# Patient Record
Sex: MALE | Race: BLACK OR AFRICAN AMERICAN | NOT HISPANIC OR LATINO | Employment: FULL TIME | URBAN - METROPOLITAN AREA
[De-identification: names, ages, dates, MRNs, and addresses within clinical notes are randomized per-mention and may not be internally consistent; named-entity substitution may affect disease eponyms.]

---

## 2017-02-23 ENCOUNTER — OFFICE VISIT (OUTPATIENT)
Dept: INTERNAL MEDICINE | Facility: CLINIC | Age: 42
End: 2017-02-23

## 2017-02-23 VITALS
SYSTOLIC BLOOD PRESSURE: 126 MMHG | HEIGHT: 70 IN | OXYGEN SATURATION: 98 % | BODY MASS INDEX: 29.75 KG/M2 | WEIGHT: 207.8 LBS | HEART RATE: 65 BPM | DIASTOLIC BLOOD PRESSURE: 88 MMHG

## 2017-02-23 DIAGNOSIS — I10 UNCONTROLLED HYPERTENSION: ICD-10-CM

## 2017-02-23 DIAGNOSIS — Z00.00 ANNUAL PHYSICAL EXAM: Primary | ICD-10-CM

## 2017-02-23 DIAGNOSIS — J45.40 MODERATE PERSISTENT ASTHMA WITHOUT COMPLICATION: ICD-10-CM

## 2017-02-23 LAB
25(OH)D3+25(OH)D2 SERPL-MCNC: 18.8 NG/ML
ALBUMIN SERPL-MCNC: 4.5 G/DL (ref 3.2–4.8)
ALBUMIN/GLOB SERPL: 1.5 G/DL (ref 1.5–2.5)
ALP SERPL-CCNC: 79 U/L (ref 25–100)
ALT SERPL-CCNC: 52 U/L (ref 7–40)
AST SERPL-CCNC: 36 U/L (ref 0–33)
BILIRUB BLD-MCNC: NEGATIVE MG/DL
BILIRUB SERPL-MCNC: 0.5 MG/DL (ref 0.3–1.2)
BUN SERPL-MCNC: 16 MG/DL (ref 9–23)
BUN/CREAT SERPL: 11.4 (ref 7–25)
CALCIUM SERPL-MCNC: 9.9 MG/DL (ref 8.7–10.4)
CHLORIDE SERPL-SCNC: 104 MMOL/L (ref 99–109)
CHOLEST SERPL-MCNC: 277 MG/DL (ref 0–200)
CLARITY, POC: CLEAR
CO2 SERPL-SCNC: 23 MMOL/L (ref 20–31)
COLOR UR: YELLOW
CREAT SERPL-MCNC: 1.4 MG/DL (ref 0.6–1.3)
ERYTHROCYTE [DISTWIDTH] IN BLOOD BY AUTOMATED COUNT: 13.9 % (ref 11.3–14.5)
GLOBULIN SER CALC-MCNC: 3.1 GM/DL
GLUCOSE SERPL-MCNC: 86 MG/DL (ref 70–100)
GLUCOSE UR STRIP-MCNC: NEGATIVE MG/DL
HCT VFR BLD AUTO: 44.8 % (ref 38.9–50.9)
HDLC SERPL-MCNC: 48 MG/DL (ref 40–60)
HGB BLD-MCNC: 14.6 G/DL (ref 13.1–17.5)
KETONES UR QL: NEGATIVE
LDLC SERPL CALC-MCNC: 211 MG/DL (ref 0–100)
LEUKOCYTE EST, POC: NEGATIVE
MCH RBC QN AUTO: 27.5 PG (ref 27–31)
MCHC RBC AUTO-ENTMCNC: 32.6 G/DL (ref 32–36)
MCV RBC AUTO: 84.4 FL (ref 80–99)
NITRITE UR-MCNC: NEGATIVE MG/ML
PH UR: 7 [PH] (ref 5–8)
PLATELET # BLD AUTO: 186 10*3/MM3 (ref 150–450)
POTASSIUM SERPL-SCNC: 4.1 MMOL/L (ref 3.5–5.5)
PROT SERPL-MCNC: 7.6 G/DL (ref 5.7–8.2)
PROT UR STRIP-MCNC: NEGATIVE MG/DL
PSA SERPL-MCNC: 0.8 NG/ML (ref 0–4)
RBC # BLD AUTO: 5.31 10*6/MM3 (ref 4.2–5.76)
RBC # UR STRIP: NEGATIVE /UL
SODIUM SERPL-SCNC: 141 MMOL/L (ref 132–146)
SP GR UR: 1.01 (ref 1–1.03)
TRIGL SERPL-MCNC: 90 MG/DL (ref 0–150)
TSH SERPL DL<=0.005 MIU/L-ACNC: 0.43 MIU/ML (ref 0.35–5.35)
UROBILINOGEN UR QL: NORMAL
VLDLC SERPL CALC-MCNC: 18 MG/DL
WBC # BLD AUTO: 4.59 10*3/MM3 (ref 3.5–10.8)

## 2017-02-23 PROCEDURE — 81003 URINALYSIS AUTO W/O SCOPE: CPT | Performed by: INTERNAL MEDICINE

## 2017-02-23 PROCEDURE — 93000 ELECTROCARDIOGRAM COMPLETE: CPT | Performed by: INTERNAL MEDICINE

## 2017-02-23 PROCEDURE — 99396 PREV VISIT EST AGE 40-64: CPT | Performed by: INTERNAL MEDICINE

## 2017-02-23 RX ORDER — CETIRIZINE HYDROCHLORIDE 10 MG/1
10 TABLET ORAL DAILY
COMMUNITY
End: 2018-10-15 | Stop reason: SDUPTHER

## 2017-02-23 RX ORDER — LISINOPRIL AND HYDROCHLOROTHIAZIDE 12.5; 1 MG/1; MG/1
1 TABLET ORAL DAILY
Qty: 30 TABLET | Refills: 5 | Status: SHIPPED | OUTPATIENT
Start: 2017-02-23 | End: 2017-04-11

## 2017-02-23 NOTE — PROGRESS NOTES
Chief Complaint   Patient presents with   • Annual Exam       History of Present Illness  HM, Adult Male: The patient is being seen for a health maintenance evaluation. The last health maintenance visit was 1 year(s) ago.   Social History: Household members include spouse. He is  with _ children. Work status: working full time. The patient has never smoked cigarettes. He reports never drinking alcohol. He has never used illicit drugs.   General Health: The patient's health is described as good. He has regular dental visits. He denies vision problems. He denies hearing loss. Immunizations status: up to date.   Lifestyle:. He consumes a diverse and healthy diet. He does not have any weight concerns. He exercises regularly. He does not use tobacco. He denies alcohol use. He denies drug use.   Screening: Cancer screening reviewed and current.   Metabolic screening reviewed and current.   Risk screening reviewed and current.   Tc reports that his BP has been up on a couple of occasions, concerned as his friend recently was admitted with aortic dissection from elevated BP.    Review of Systems   Constitutional: Negative for appetite change, chills, fatigue and fever.   HENT: Negative for congestion, dental problem, ear discharge, hearing loss, postnasal drip, sinus pressure, sore throat, tinnitus and voice change.    Eyes: Negative for pain and visual disturbance.   Respiratory: Negative for cough, chest tightness, shortness of breath and wheezing.    Cardiovascular: Negative for chest pain, palpitations and leg swelling.   Gastrointestinal: Negative for abdominal distention, abdominal pain, blood in stool, constipation, diarrhea, nausea and vomiting.   Endocrine: Negative for cold intolerance, heat intolerance and polyuria.   Genitourinary: Negative for decreased urine volume, difficulty urinating, discharge, flank pain, frequency, hematuria, scrotal swelling, testicular pain and urgency.   Musculoskeletal:  "Negative for arthralgias, back pain, neck pain and neck stiffness.   Skin: Negative for color change, rash and wound.   Allergic/Immunologic: Negative for environmental allergies and food allergies.   Neurological: Negative for dizziness, tremors, seizures, syncope, speech difficulty, weakness, light-headedness and headaches.   Hematological: Does not bruise/bleed easily.   Psychiatric/Behavioral: Negative for agitation, behavioral problems, confusion, hallucinations, sleep disturbance and suicidal ideas. The patient is not hyperactive.        Patient Active Problem List   Diagnosis   • Allergic rhinitis   • Asthma   • Uncontrolled hypertension       Social History     Social History   • Marital status:      Spouse name: N/A   • Number of children: N/A   • Years of education: N/A     Occupational History   • Not on file.     Social History Main Topics   • Smoking status: Former Smoker     Types: Cigarettes   • Smokeless tobacco: Never Used   • Alcohol use 1.8 oz/week     3 Cans of beer per week   • Drug use: No   • Sexual activity: Defer     Other Topics Concern   • Not on file     Social History Narrative       Current Outpatient Prescriptions on File Prior to Visit   Medication Sig Dispense Refill   • albuterol (PROAIR HFA) 108 (90 BASE) MCG/ACT inhaler Inhale 2 puffs every 4 (four) hours as needed for wheezing. 25.5 g 0   • budesonide-formoterol (SYMBICORT) 160-4.5 MCG/ACT inhaler Inhale 2 puffs 2 (Two) Times a Day. 1 inhaler 3   • [DISCONTINUED] triamterene-hydrochlorothiazide (MAXZIDE-25) 37.5-25 MG per tablet Take 1 tablet by mouth daily. 90 tablet 1   • [DISCONTINUED] montelukast (SINGULAIR) 10 MG tablet Take 1 tablet by mouth daily.       No current facility-administered medications on file prior to visit.        No Known Allergies    Visit Vitals   • /88   • Pulse 65   • Ht 70\" (177.8 cm)   • Wt 207 lb 12.8 oz (94.3 kg)   • SpO2 98%  Comment: ra   • BMI 29.82 kg/m2            The following " portions of the patient's history were reviewed and updated as appropriate: allergies, current medications, past family history, past medical history, past social history, past surgical history and problem list.    Physical Exam   Constitutional: He is oriented to person, place, and time. He appears well-developed and well-nourished.   HENT:   Head: Normocephalic and atraumatic.   Right Ear: External ear normal.   Left Ear: External ear normal.   Mouth/Throat: No oropharyngeal exudate.   Eyes: Conjunctivae are normal. Pupils are equal, round, and reactive to light. No scleral icterus.   Neck: Neck supple. No thyromegaly present.   Cardiovascular: Normal rate, regular rhythm and intact distal pulses.  Exam reveals no gallop and no friction rub.    No murmur heard.  Pulmonary/Chest: Effort normal and breath sounds normal. He has no wheezes. He has no rales.   Abdominal: Soft. Bowel sounds are normal. He exhibits no distension. There is no tenderness. There is no rebound and no guarding.   Musculoskeletal: He exhibits no edema.   Lymphadenopathy:     He has no cervical adenopathy.   Neurological: He is alert and oriented to person, place, and time. He displays normal reflexes. No cranial nerve deficit. He exhibits normal muscle tone. Coordination normal.   Skin: Skin is warm and dry.   Psychiatric: He has a normal mood and affect. His behavior is normal. Judgment and thought content normal.   Nursing note and vitals reviewed.       ECG 12 Lead  Date/Time: 2/23/2017 2:26 PM  Performed by: VIVIANA DE GUZMAN  Authorized by: VIVIANA DE GUZMAN   Comparison: not compared with previous ECG   Previous ECG: no previous ECG available  Rhythm: sinus rhythm  Rate: normal  Conduction: conduction normal  ST Segments: ST segments normal  T Waves: T waves normal  QRS axis: normal  Other: no other findings  Clinical impression: normal ECG            Results for orders placed or performed in visit on 02/23/17   POCT urinalysis dipstick,  automated   Result Value Ref Range    Color Yellow Yellow, Straw, Dark Yellow, Hanna    Clarity, UA Clear Clear    Glucose, UA Negative Negative, 1000 mg/dL (3+) mg/dL    Bilirubin Negative Negative    Ketones, UA Negative Negative    Specific Gravity  1.010 1.005 - 1.030    Blood, UA Negative Negative    pH, Urine 7.0 5.0 - 8.0    Protein, POC Negative Negative mg/dL    Urobilinogen, UA Normal Normal    Leukocytes Negative Negative    Nitrite, UA Negative Negative       Tc was seen today for annual exam.    Diagnoses and all orders for this visit:    Annual physical exam  -     POCT urinalysis dipstick, automated  -     CBC (No Diff)  -     Comprehensive Metabolic Panel  -     Lipid Panel  -     TSH  -     Vitamin D 25 Hydroxy  -     PSA    Uncontrolled hypertension  -     ECG 12 Lead    Moderate persistent asthma without complication  -     lisinopril-hydrochlorothiazide (PRINZIDE,ZESTORETIC) 10-12.5 MG per tablet; Take 1 tablet by mouth Daily.    Reassured, ekg ok, start meds as above, and review in 6 weeks.      Health Maintenance   Topic Date Due   • TDAP/TD VACCINES (1 - Tdap) 04/23/1994   • INFLUENZA VACCINE  08/01/2016       Discussion/Summary  Impression: health maintenance visit, healthy adult male.   Currently, he eats a healthy diet and has an adequate exercise regimen.   Prostate cancer screening: PSA was ordered. declines mike.  Testicular cancer screening: monthly self testicular exam was advised.   Colorectal cancer screening: fecal occult blood testing is needed every year and colonoscopy .   Screening lab work includes glucose, lipid profile and 25-hydroxyvitamin D.   The immunizations are up to date.   Advice and education were given regarding cardiovascular risk reduction and self skin examination.   Patient discussion: discussed with the patient.   Will give samples of symbicort at f/u as forgot to do so today.    Return in about 6 weeks (around 4/6/2017).

## 2017-02-26 RX ORDER — ATORVASTATIN CALCIUM 10 MG/1
10 TABLET, FILM COATED ORAL NIGHTLY
Qty: 30 TABLET | Refills: 5 | Status: SHIPPED | OUTPATIENT
Start: 2017-02-26 | End: 2017-09-18 | Stop reason: SDUPTHER

## 2017-04-11 ENCOUNTER — OFFICE VISIT (OUTPATIENT)
Dept: INTERNAL MEDICINE | Facility: CLINIC | Age: 42
End: 2017-04-11

## 2017-04-11 VITALS
HEART RATE: 78 BPM | SYSTOLIC BLOOD PRESSURE: 142 MMHG | DIASTOLIC BLOOD PRESSURE: 96 MMHG | HEIGHT: 70 IN | BODY MASS INDEX: 29.75 KG/M2 | WEIGHT: 207.8 LBS | OXYGEN SATURATION: 97 %

## 2017-04-11 DIAGNOSIS — I10 UNCONTROLLED HYPERTENSION: Primary | ICD-10-CM

## 2017-04-11 DIAGNOSIS — J30.9 ALLERGIC RHINITIS, UNSPECIFIED ALLERGIC RHINITIS TRIGGER, UNSPECIFIED RHINITIS SEASONALITY: ICD-10-CM

## 2017-04-11 DIAGNOSIS — J45.40 MODERATE PERSISTENT ASTHMA WITHOUT COMPLICATION: ICD-10-CM

## 2017-04-11 PROCEDURE — 99213 OFFICE O/P EST LOW 20 MIN: CPT | Performed by: INTERNAL MEDICINE

## 2017-04-11 RX ORDER — CHLORTHALIDONE 25 MG/1
25 TABLET ORAL DAILY
Qty: 90 TABLET | Refills: 1 | Status: SHIPPED | OUTPATIENT
Start: 2017-04-11 | End: 2017-07-11 | Stop reason: SDUPTHER

## 2017-04-11 RX ORDER — MONTELUKAST SODIUM 10 MG/1
10 TABLET ORAL NIGHTLY
Qty: 30 TABLET | Refills: 5 | Status: SHIPPED | OUTPATIENT
Start: 2017-04-11 | End: 2017-11-08 | Stop reason: SDUPTHER

## 2017-04-11 RX ORDER — LISINOPRIL 10 MG/1
10 TABLET ORAL DAILY
Qty: 90 TABLET | Refills: 1 | Status: SHIPPED | OUTPATIENT
Start: 2017-04-11 | End: 2017-07-11 | Stop reason: SDUPTHER
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
## 2017-05-04 ENCOUNTER — LAB (OUTPATIENT)
Dept: INTERNAL MEDICINE | Facility: CLINIC | Age: 42
End: 2017-05-04

## 2017-05-04 DIAGNOSIS — I10 UNCONTROLLED HYPERTENSION: Primary | ICD-10-CM

## 2017-05-04 LAB
BUN SERPL-MCNC: 20 MG/DL (ref 9–23)
BUN/CREAT SERPL: 16.7 (ref 7–25)
CALCIUM SERPL-MCNC: 9.5 MG/DL (ref 8.7–10.4)
CHLORIDE SERPL-SCNC: 100 MMOL/L (ref 99–109)
CO2 SERPL-SCNC: 33 MMOL/L (ref 20–31)
CREAT SERPL-MCNC: 1.2 MG/DL (ref 0.6–1.3)
GLUCOSE SERPL-MCNC: 86 MG/DL (ref 70–100)
POTASSIUM SERPL-SCNC: 3.2 MMOL/L (ref 3.5–5.5)
SODIUM SERPL-SCNC: 140 MMOL/L (ref 132–146)

## 2017-07-11 ENCOUNTER — OFFICE VISIT (OUTPATIENT)
Dept: INTERNAL MEDICINE | Facility: CLINIC | Age: 42
End: 2017-07-11

## 2017-07-11 VITALS
SYSTOLIC BLOOD PRESSURE: 118 MMHG | DIASTOLIC BLOOD PRESSURE: 80 MMHG | BODY MASS INDEX: 29.7 KG/M2 | OXYGEN SATURATION: 99 % | HEART RATE: 65 BPM | WEIGHT: 207 LBS

## 2017-07-11 DIAGNOSIS — I10 UNCONTROLLED HYPERTENSION: ICD-10-CM

## 2017-07-11 DIAGNOSIS — E87.6 HYPOKALEMIA: ICD-10-CM

## 2017-07-11 DIAGNOSIS — E78.2 MIXED HYPERLIPIDEMIA: Primary | ICD-10-CM

## 2017-07-11 PROCEDURE — 99213 OFFICE O/P EST LOW 20 MIN: CPT | Performed by: INTERNAL MEDICINE

## 2017-07-11 RX ORDER — CHLORTHALIDONE 25 MG/1
25 TABLET ORAL DAILY
Qty: 90 TABLET | Refills: 1 | Status: SHIPPED | OUTPATIENT
Start: 2017-07-11 | End: 2017-12-11 | Stop reason: SDUPTHER

## 2017-07-11 RX ORDER — LISINOPRIL 10 MG/1
10 TABLET ORAL DAILY
Qty: 90 TABLET | Refills: 1 | Status: SHIPPED | OUTPATIENT
Start: 2017-07-11 | End: 2017-12-11 | Stop reason: SDUPTHER
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
## 2017-07-14 ENCOUNTER — LAB (OUTPATIENT)
Dept: INTERNAL MEDICINE | Facility: CLINIC | Age: 42
End: 2017-07-14

## 2017-07-14 DIAGNOSIS — I10 UNCONTROLLED HYPERTENSION: ICD-10-CM

## 2017-07-14 DIAGNOSIS — E78.2 MIXED HYPERLIPIDEMIA: Primary | ICD-10-CM

## 2017-07-14 LAB
ALBUMIN SERPL-MCNC: 4.2 G/DL (ref 3.2–4.8)
ALBUMIN/GLOB SERPL: 1.4 G/DL (ref 1.5–2.5)
ALP SERPL-CCNC: 73 U/L (ref 25–100)
ALT SERPL-CCNC: 54 U/L (ref 7–40)
AST SERPL-CCNC: 32 U/L (ref 0–33)
BILIRUB SERPL-MCNC: 0.5 MG/DL (ref 0.3–1.2)
BUN SERPL-MCNC: 16 MG/DL (ref 9–23)
BUN/CREAT SERPL: 11.4 (ref 7–25)
CALCIUM SERPL-MCNC: 9.9 MG/DL (ref 8.7–10.4)
CHLORIDE SERPL-SCNC: 96 MMOL/L (ref 99–109)
CHOLEST SERPL-MCNC: 194 MG/DL (ref 0–200)
CO2 SERPL-SCNC: 30 MMOL/L (ref 20–31)
CREAT SERPL-MCNC: 1.4 MG/DL (ref 0.6–1.3)
GLOBULIN SER CALC-MCNC: 3.1 GM/DL
GLUCOSE SERPL-MCNC: 99 MG/DL (ref 70–100)
HDLC SERPL-MCNC: 45 MG/DL (ref 40–60)
LDLC SERPL CALC-MCNC: 132 MG/DL (ref 0–100)
POTASSIUM SERPL-SCNC: 3.3 MMOL/L (ref 3.5–5.5)
PROT SERPL-MCNC: 7.3 G/DL (ref 5.7–8.2)
SODIUM SERPL-SCNC: 136 MMOL/L (ref 132–146)
TRIGL SERPL-MCNC: 86 MG/DL (ref 0–150)
VLDLC SERPL CALC-MCNC: 17.2 MG/DL

## 2017-08-06 PROBLEM — E87.6 HYPOKALEMIA: Status: ACTIVE | Noted: 2017-08-06

## 2017-08-06 RX ORDER — POTASSIUM CHLORIDE 750 MG/1
10 TABLET, FILM COATED, EXTENDED RELEASE ORAL DAILY
Qty: 30 TABLET | Refills: 5 | Status: SHIPPED | OUTPATIENT
Start: 2017-08-06 | End: 2017-12-11 | Stop reason: SDUPTHER

## 2017-09-18 RX ORDER — ATORVASTATIN CALCIUM 10 MG/1
TABLET, FILM COATED ORAL
Qty: 30 TABLET | Refills: 5 | Status: SHIPPED | OUTPATIENT
Start: 2017-09-18 | End: 2017-12-11 | Stop reason: SDUPTHER

## 2017-11-08 DIAGNOSIS — J30.9 ALLERGIC RHINITIS: ICD-10-CM

## 2017-11-08 RX ORDER — MONTELUKAST SODIUM 10 MG/1
TABLET ORAL
Qty: 30 TABLET | Refills: 5 | Status: SHIPPED | OUTPATIENT
Start: 2017-11-08 | End: 2018-04-03 | Stop reason: SDUPTHER

## 2017-11-28 DIAGNOSIS — J45.40 MODERATE PERSISTENT ASTHMA WITHOUT COMPLICATION: ICD-10-CM

## 2017-11-28 RX ORDER — ALBUTEROL SULFATE 90 UG/1
2 AEROSOL, METERED RESPIRATORY (INHALATION) EVERY 4 HOURS PRN
Qty: 1 INHALER | Refills: 3 | Status: SHIPPED | OUTPATIENT
Start: 2017-11-28 | End: 2018-04-03 | Stop reason: SDUPTHER

## 2017-12-11 ENCOUNTER — OFFICE VISIT (OUTPATIENT)
Dept: INTERNAL MEDICINE | Facility: CLINIC | Age: 42
End: 2017-12-11

## 2017-12-11 VITALS
WEIGHT: 214.8 LBS | SYSTOLIC BLOOD PRESSURE: 130 MMHG | BODY MASS INDEX: 30.82 KG/M2 | DIASTOLIC BLOOD PRESSURE: 90 MMHG | OXYGEN SATURATION: 98 % | HEART RATE: 79 BPM

## 2017-12-11 DIAGNOSIS — I10 UNCONTROLLED HYPERTENSION: Primary | ICD-10-CM

## 2017-12-11 DIAGNOSIS — Z23 FLU VACCINE NEED: ICD-10-CM

## 2017-12-11 DIAGNOSIS — E87.6 HYPOKALEMIA: ICD-10-CM

## 2017-12-11 DIAGNOSIS — J45.20 MILD INTERMITTENT ASTHMA WITHOUT COMPLICATION: ICD-10-CM

## 2017-12-11 DIAGNOSIS — E78.2 MIXED HYPERLIPIDEMIA: ICD-10-CM

## 2017-12-11 LAB
ALBUMIN SERPL-MCNC: 4.5 G/DL (ref 3.2–4.8)
ALBUMIN/GLOB SERPL: 1.5 G/DL (ref 1.5–2.5)
ALP SERPL-CCNC: 73 U/L (ref 25–100)
ALT SERPL-CCNC: 64 U/L (ref 7–40)
AST SERPL-CCNC: 37 U/L (ref 0–33)
BILIRUB SERPL-MCNC: 0.5 MG/DL (ref 0.3–1.2)
BUN SERPL-MCNC: 20 MG/DL (ref 9–23)
BUN/CREAT SERPL: 15.4 (ref 7–25)
CALCIUM SERPL-MCNC: 9.9 MG/DL (ref 8.7–10.4)
CHLORIDE SERPL-SCNC: 98 MMOL/L (ref 99–109)
CHOLEST SERPL-MCNC: 211 MG/DL (ref 0–200)
CO2 SERPL-SCNC: 30 MMOL/L (ref 20–31)
CREAT SERPL-MCNC: 1.3 MG/DL (ref 0.6–1.3)
GFR SERPLBLD CREATININE-BSD FMLA CKD-EPI: 61 ML/MIN/1.73
GFR SERPLBLD CREATININE-BSD FMLA CKD-EPI: 73 ML/MIN/1.73
GLOBULIN SER CALC-MCNC: 3.1 GM/DL
GLUCOSE SERPL-MCNC: 89 MG/DL (ref 70–100)
HDLC SERPL-MCNC: 50 MG/DL (ref 40–60)
LDLC SERPL CALC-MCNC: 143 MG/DL (ref 0–100)
POTASSIUM SERPL-SCNC: 4.1 MMOL/L (ref 3.5–5.5)
PROT SERPL-MCNC: 7.6 G/DL (ref 5.7–8.2)
SODIUM SERPL-SCNC: 138 MMOL/L (ref 132–146)
TRIGL SERPL-MCNC: 88 MG/DL (ref 0–150)
VLDLC SERPL CALC-MCNC: 17.6 MG/DL

## 2017-12-11 PROCEDURE — 90471 IMMUNIZATION ADMIN: CPT | Performed by: INTERNAL MEDICINE

## 2017-12-11 PROCEDURE — 99214 OFFICE O/P EST MOD 30 MIN: CPT | Performed by: INTERNAL MEDICINE

## 2017-12-11 PROCEDURE — 90686 IIV4 VACC NO PRSV 0.5 ML IM: CPT | Performed by: INTERNAL MEDICINE

## 2017-12-11 RX ORDER — CHLORTHALIDONE 25 MG/1
25 TABLET ORAL DAILY
Qty: 90 TABLET | Refills: 1 | Status: SHIPPED | OUTPATIENT
Start: 2017-12-11 | End: 2018-05-21

## 2017-12-11 RX ORDER — LISINOPRIL 10 MG/1
10 TABLET ORAL DAILY
Qty: 90 TABLET | Refills: 1 | Status: SHIPPED | OUTPATIENT
Start: 2017-12-11 | End: 2018-07-24 | Stop reason: SDUPTHER

## 2017-12-11 RX ORDER — BUDESONIDE AND FORMOTEROL FUMARATE DIHYDRATE 160; 4.5 UG/1; UG/1
2 AEROSOL RESPIRATORY (INHALATION)
Qty: 1 INHALER | Refills: 3 | Status: SHIPPED | OUTPATIENT
Start: 2017-12-11 | End: 2018-04-03 | Stop reason: SDUPTHER

## 2017-12-11 RX ORDER — POTASSIUM CHLORIDE 750 MG/1
10 TABLET, FILM COATED, EXTENDED RELEASE ORAL DAILY
Qty: 90 TABLET | Refills: 1 | Status: SHIPPED | OUTPATIENT
Start: 2017-12-11 | End: 2018-10-15 | Stop reason: SDUPTHER

## 2017-12-11 RX ORDER — ATORVASTATIN CALCIUM 10 MG/1
10 TABLET, FILM COATED ORAL DAILY
Qty: 90 TABLET | Refills: 1 | Status: SHIPPED | OUTPATIENT
Start: 2017-12-11 | End: 2018-10-04 | Stop reason: SDUPTHER

## 2017-12-11 NOTE — PROGRESS NOTES
Hyperlipidemia and Hypertension    Subjective   Tc Joshua is a 42 y.o. male is here today for follow-up.    History of Present Illness   Here fr f/u on HTN and HLP. Checks weekly, and usually < 120/80.  He did work out this am, between 6-7.    Current Outpatient Prescriptions:   •  albuterol (PROAIR HFA) 108 (90 Base) MCG/ACT inhaler, Inhale 2 puffs Every 4 (Four) Hours As Needed for Wheezing., Disp: 1 inhaler, Rfl: 3  •  atorvastatin (LIPITOR) 10 MG tablet, Take 1 tablet by mouth Daily., Disp: 90 tablet, Rfl: 1  •  budesonide-formoterol (SYMBICORT) 160-4.5 MCG/ACT inhaler, Inhale 2 puffs 2 (Two) Times a Day., Disp: 1 inhaler, Rfl: 3  •  cetirizine (zyrTEC) 10 MG tablet, Take 10 mg by mouth Daily., Disp: , Rfl:   •  chlorthalidone (HYGROTON) 25 MG tablet, Take 1 tablet by mouth Daily., Disp: 90 tablet, Rfl: 1  •  lisinopril (PRINIVIL,ZESTRIL) 10 MG tablet, Take 1 tablet by mouth Daily. One PO daily for BP, Disp: 90 tablet, Rfl: 1  •  montelukast (SINGULAIR) 10 MG tablet, take 1 tablet by mouth every evening, Disp: 30 tablet, Rfl: 5  •  potassium chloride (KLOR-CON) 10 MEQ CR tablet, Take 1 tablet by mouth Daily., Disp: 90 tablet, Rfl: 1      The following portions of the patient's history were reviewed and updated as appropriate: allergies, current medications, past family history, past medical history, past social history, past surgical history and problem list.    Review of Systems   Constitutional: Negative.  Negative for chills and fever.   HENT: Negative for ear discharge, ear pain, sinus pressure and sore throat.    Respiratory: Negative for cough, chest tightness and shortness of breath.    Cardiovascular: Negative for chest pain, palpitations and leg swelling.   Gastrointestinal: Negative for diarrhea, nausea and vomiting.   Musculoskeletal: Negative for arthralgias, back pain and myalgias.   Neurological: Negative for dizziness, syncope and headaches.   Psychiatric/Behavioral: Negative for confusion and  sleep disturbance.       Objective   /90  Pulse 79  Wt 97.4 kg (214 lb 12.8 oz)  SpO2 98% Comment: ra  BMI 30.82 kg/m2  Physical Exam   Constitutional: He is oriented to person, place, and time. He appears well-developed and well-nourished.   HENT:   Head: Normocephalic and atraumatic.   Right Ear: External ear normal.   Left Ear: External ear normal.   Mouth/Throat: No oropharyngeal exudate.   Eyes: Conjunctivae are normal. Pupils are equal, round, and reactive to light.   Neck: Neck supple. No thyromegaly present.   Cardiovascular: Normal rate, regular rhythm and intact distal pulses.    Pulmonary/Chest: Effort normal and breath sounds normal.   Abdominal: Soft. Bowel sounds are normal. He exhibits no distension. There is no tenderness.   Musculoskeletal: He exhibits no edema.   Neurological: He is alert and oriented to person, place, and time. No cranial nerve deficit.   Skin: Skin is warm and dry.   Psychiatric: He has a normal mood and affect. Judgment normal.   Nursing note and vitals reviewed.        Results for orders placed or performed in visit on 07/14/17   Comprehensive metabolic panel   Result Value Ref Range    Glucose 99 70 - 100 mg/dL    BUN 16 9 - 23 mg/dL    Creatinine 1.40 (H) 0.60 - 1.30 mg/dL    eGFR Non African Am 56 (L) >60 mL/min/1.73    eGFR African Am 67 >60 mL/min/1.73    BUN/Creatinine Ratio 11.4 7.0 - 25.0    Sodium 136 132 - 146 mmol/L    Potassium 3.3 (L) 3.5 - 5.5 mmol/L    Chloride 96 (L) 99 - 109 mmol/L    Total CO2 30.0 20.0 - 31.0 mmol/L    Calcium 9.9 8.7 - 10.4 mg/dL    Total Protein 7.3 5.7 - 8.2 g/dL    Albumin 4.20 3.20 - 4.80 g/dL    Globulin 3.1 gm/dL    A/G Ratio 1.4 (L) 1.5 - 2.5 g/dL    Total Bilirubin 0.5 0.3 - 1.2 mg/dL    Alkaline Phosphatase 73 25 - 100 U/L    AST (SGOT) 32 0 - 33 U/L    ALT (SGPT) 54 (H) 7 - 40 U/L   Lipid panel   Result Value Ref Range    Total Cholesterol 194 0 - 200 mg/dL    Triglycerides 86 0 - 150 mg/dL    HDL Cholesterol 45 40 - 60  mg/dL    VLDL Cholesterol 17.2 mg/dL    LDL Cholesterol  132 (H) 0 - 100 mg/dL             Assessment/Plan   Diagnoses and all orders for this visit:    Uncontrolled hypertension  -     chlorthalidone (HYGROTON) 25 MG tablet; Take 1 tablet by mouth Daily.  -     lisinopril (PRINIVIL,ZESTRIL) 10 MG tablet; Take 1 tablet by mouth Daily. One PO daily for BP  -     potassium chloride (KLOR-CON) 10 MEQ CR tablet; Take 1 tablet by mouth Daily.  -     Comprehensive Metabolic Panel    Mixed hyperlipidemia  -     atorvastatin (LIPITOR) 10 MG tablet; Take 1 tablet by mouth Daily.  -     Comprehensive Metabolic Panel  -     Lipid Panel    Mild intermittent asthma without complication  -     budesonide-formoterol (SYMBICORT) 160-4.5 MCG/ACT inhaler; Inhale 2 puffs 2 (Two) Times a Day.    Hypokalemia  -     potassium chloride (KLOR-CON) 10 MEQ CR tablet; Take 1 tablet by mouth Daily.  -     Comprehensive Metabolic Panel    Flu vaccine need  -     Flu Vaccine Quad PF >18YR (AFLURIA [id number])               Return in about 6 months (around 6/11/2018) for Next scheduled follow up.

## 2018-04-03 ENCOUNTER — OFFICE VISIT (OUTPATIENT)
Dept: INTERNAL MEDICINE | Facility: CLINIC | Age: 43
End: 2018-04-03

## 2018-04-03 VITALS
HEART RATE: 80 BPM | BODY MASS INDEX: 27.92 KG/M2 | DIASTOLIC BLOOD PRESSURE: 94 MMHG | SYSTOLIC BLOOD PRESSURE: 136 MMHG | WEIGHT: 195 LBS | HEIGHT: 70 IN | OXYGEN SATURATION: 97 %

## 2018-04-03 DIAGNOSIS — Z00.00 ANNUAL PHYSICAL EXAM: Primary | ICD-10-CM

## 2018-04-03 DIAGNOSIS — J45.40 MODERATE PERSISTENT ASTHMA WITHOUT COMPLICATION: ICD-10-CM

## 2018-04-03 DIAGNOSIS — I10 UNCONTROLLED HYPERTENSION: ICD-10-CM

## 2018-04-03 DIAGNOSIS — E78.2 MIXED HYPERLIPIDEMIA: ICD-10-CM

## 2018-04-03 DIAGNOSIS — J30.89 CHRONIC ALLERGIC RHINITIS DUE TO OTHER ALLERGIC TRIGGER, UNSPECIFIED SEASONALITY: ICD-10-CM

## 2018-04-03 LAB
BILIRUB BLD-MCNC: NEGATIVE MG/DL
CLARITY, POC: CLEAR
COLOR UR: YELLOW
GLUCOSE UR STRIP-MCNC: NEGATIVE MG/DL
KETONES UR QL: NEGATIVE
LEUKOCYTE EST, POC: NEGATIVE
NITRITE UR-MCNC: NEGATIVE MG/ML
PH UR: 6.5 [PH] (ref 5–8)
PROT UR STRIP-MCNC: NEGATIVE MG/DL
RBC # UR STRIP: NEGATIVE /UL
SP GR UR: 1.01 (ref 1–1.03)
UROBILINOGEN UR QL: ABNORMAL

## 2018-04-03 PROCEDURE — 81003 URINALYSIS AUTO W/O SCOPE: CPT | Performed by: INTERNAL MEDICINE

## 2018-04-03 PROCEDURE — 99396 PREV VISIT EST AGE 40-64: CPT | Performed by: INTERNAL MEDICINE

## 2018-04-03 RX ORDER — ALBUTEROL SULFATE 90 UG/1
2 AEROSOL, METERED RESPIRATORY (INHALATION) EVERY 4 HOURS PRN
Qty: 1 INHALER | Refills: 3 | Status: SHIPPED | OUTPATIENT
Start: 2018-04-03 | End: 2018-10-15 | Stop reason: SDUPTHER

## 2018-04-03 RX ORDER — MONTELUKAST SODIUM 10 MG/1
10 TABLET ORAL EVERY EVENING
Qty: 90 TABLET | Refills: 1 | Status: SHIPPED | OUTPATIENT
Start: 2018-04-03 | End: 2018-10-15 | Stop reason: SDUPTHER

## 2018-04-03 RX ORDER — BUDESONIDE AND FORMOTEROL FUMARATE DIHYDRATE 160; 4.5 UG/1; UG/1
2 AEROSOL RESPIRATORY (INHALATION)
Qty: 1 INHALER | Refills: 3 | Status: SHIPPED | OUTPATIENT
Start: 2018-04-03 | End: 2018-10-15 | Stop reason: SDUPTHER

## 2018-04-03 NOTE — PROGRESS NOTES
Chief Complaint   Patient presents with   • Annual Exam       History of Present Illness  HM, Adult Male:  The patient is being seen for a health maintenance evaluation. The last health maintenance visit was 1 year(s) ago.   Social History: Household members include spouse. He is  with children. Work status: working full time. The patient has never smoked cigarettes. He reports never drinking alcohol. He has never used illicit drugs.   General Health: The patient's health is described as good. He has regular dental visits. He denies vision problems. He denies hearing loss. Immunizations status: up to date.   Lifestyle:. He consumes a diverse and healthy diet. He does not have any weight concerns. He exercises regularly. He does not use tobacco. He denies alcohol use. He denies drug use.   Screening: Cancer screening reviewed and current.   Metabolic screening reviewed and current.   Risk screening reviewed and current.     BP is usually much better at home, between 120- 125, and diastolic 80-85     Review of Systems   Constitutional: Negative.  Negative for chills and fever.   HENT: Negative for ear discharge, ear pain, sinus pressure and sore throat.    Eyes: Negative for pain and redness.   Respiratory: Positive for shortness of breath (better). Negative for cough and chest tightness.    Cardiovascular: Negative for chest pain, palpitations and leg swelling.   Gastrointestinal: Negative for diarrhea, nausea and vomiting.   Genitourinary: Negative for frequency.   Musculoskeletal: Negative for arthralgias, back pain and myalgias.   Skin: Negative for pallor and rash.   Neurological: Negative for dizziness, syncope and headaches.   Psychiatric/Behavioral: Negative for confusion and sleep disturbance.       Patient Active Problem List   Diagnosis   • Allergic rhinitis   • Asthma   • Uncontrolled hypertension   • Mixed hyperlipidemia   • Hypokalemia       Social History     Social History   • Marital status:  "     Spouse name: N/A   • Number of children: N/A   • Years of education: N/A     Occupational History   • Not on file.     Social History Main Topics   • Smoking status: Former Smoker     Types: Cigarettes   • Smokeless tobacco: Never Used   • Alcohol use 1.8 oz/week     3 Cans of beer per week   • Drug use: No   • Sexual activity: Defer     Other Topics Concern   • Not on file     Social History Narrative   • No narrative on file       Current Outpatient Prescriptions on File Prior to Visit   Medication Sig Dispense Refill   • atorvastatin (LIPITOR) 10 MG tablet Take 1 tablet by mouth Daily. 90 tablet 1   • cetirizine (zyrTEC) 10 MG tablet Take 10 mg by mouth Daily.     • chlorthalidone (HYGROTON) 25 MG tablet Take 1 tablet by mouth Daily. 90 tablet 1   • lisinopril (PRINIVIL,ZESTRIL) 10 MG tablet Take 1 tablet by mouth Daily. One PO daily for BP 90 tablet 1   • potassium chloride (KLOR-CON) 10 MEQ CR tablet Take 1 tablet by mouth Daily. 90 tablet 1     No current facility-administered medications on file prior to visit.        No Known Allergies    /94   Pulse 80   Ht 177.8 cm (70\")   Wt 88.5 kg (195 lb)   SpO2 97%   BMI 27.98 kg/m²          The following portions of the patient's history were reviewed and updated as appropriate: allergies, current medications, past family history, past medical history, past social history, past surgical history and problem list.    Physical Exam   Constitutional: He is oriented to person, place, and time. He appears well-developed and well-nourished.   HENT:   Head: Normocephalic and atraumatic.   Right Ear: External ear normal.   Left Ear: External ear normal.   Mouth/Throat: No oropharyngeal exudate.   Eyes: Conjunctivae are normal. Pupils are equal, round, and reactive to light. No scleral icterus.   Neck: Neck supple. No thyromegaly present.   Cardiovascular: Normal rate, regular rhythm and intact distal pulses.    No murmur heard.  Pulmonary/Chest: Effort " normal and breath sounds normal. He has no wheezes. He has no rales.   Abdominal: Soft. Bowel sounds are normal. He exhibits no distension. There is no tenderness. There is no rebound.   Musculoskeletal: He exhibits no edema.   Lymphadenopathy:     He has no cervical adenopathy.   Neurological: He is alert and oriented to person, place, and time. No cranial nerve deficit. Coordination normal.   Skin: Skin is warm and dry.   Psychiatric: He has a normal mood and affect. His behavior is normal. Judgment and thought content normal.   Nursing note and vitals reviewed.      Results for orders placed or performed in visit on 04/03/18   CBC (No Diff)   Result Value Ref Range    WBC 4.98 3.50 - 10.80 10*3/mm3    RBC 5.10 4.20 - 5.76 10*6/mm3    Hemoglobin 14.4 13.1 - 17.5 g/dL    Hematocrit 43.2 38.9 - 50.9 %    MCV 84.7 80.0 - 99.0 fL    MCH 28.2 27.0 - 31.0 pg    MCHC 33.3 32.0 - 36.0 g/dL    RDW 14.0 11.3 - 14.5 %    RDW-SD 43.5 37.0 - 54.0 fl    MPV 10.5 6.0 - 12.0 fL    Platelets 205 150 - 450 10*3/mm3   Comprehensive Metabolic Panel   Result Value Ref Range    Glucose 90 70 - 100 mg/dL    BUN 22 9 - 23 mg/dL    Creatinine 1.40 (H) 0.60 - 1.30 mg/dL    Sodium 136 132 - 146 mmol/L    Potassium 3.5 3.5 - 5.5 mmol/L    Chloride 93 (L) 99 - 109 mmol/L    CO2 33.0 (H) 20.0 - 31.0 mmol/L    Calcium 9.8 8.7 - 10.4 mg/dL    Total Protein 7.4 5.7 - 8.2 g/dL    Albumin 4.50 3.20 - 4.80 g/dL    ALT (SGPT) 61 (H) 7 - 40 U/L    AST (SGOT) 37 (H) 0 - 33 U/L    Alkaline Phosphatase 74 25 - 100 U/L    Total Bilirubin 0.5 0.3 - 1.2 mg/dL    eGFR  African Amer 67 >60 mL/min/1.73    Globulin 2.9 gm/dL    A/G Ratio 1.6 1.5 - 2.5 g/dL    BUN/Creatinine Ratio 15.7 7.0 - 25.0    Anion Gap 10.0 3.0 - 11.0 mmol/L   Lipid Panel   Result Value Ref Range    Total Cholesterol 183 0 - 200 mg/dL    Triglycerides 70 0 - 150 mg/dL    HDL Cholesterol 50 40 - 60 mg/dL    LDL Cholesterol  119 0 - 130 mg/dL   TSH   Result Value Ref Range    TSH 0.471  0.350 - 5.350 mIU/mL   Vitamin D 25 Hydroxy   Result Value Ref Range    25 Hydroxy, Vitamin D 31.8 ng/ml   POC Urinalysis Dipstick, Automated   Result Value Ref Range    Color Yellow Yellow, Straw, Dark Yellow, Hanna    Clarity, UA Clear Clear    Glucose, UA Negative Negative, 1000 mg/dL (3+) mg/dL    Bilirubin Negative Negative    Ketones, UA Negative Negative    Specific Gravity  1.015 1.005 - 1.030    Blood, UA Negative Negative    pH, Urine 6.5 5.0 - 8.0    Protein, POC Negative Negative mg/dL    Urobilinogen, UA 1 E.U./dL  (A) Normal    Leukocytes Negative Negative    Nitrite, UA Negative Negative       Tc was seen today for annual exam.    Diagnoses and all orders for this visit:    Annual physical exam  -     POC Urinalysis Dipstick, Automated  -     CBC (No Diff)  -     Comprehensive Metabolic Panel  -     Lipid Panel  -     TSH  -     Vitamin D 25 Hydroxy    Moderate persistent asthma without complication  -     budesonide-formoterol (SYMBICORT) 160-4.5 MCG/ACT inhaler; Inhale 2 puffs 2 (Two) Times a Day.  -     albuterol (PROAIR HFA) 108 (90 Base) MCG/ACT inhaler; Inhale 2 puffs Every 4 (Four) Hours As Needed for Wheezing.    Chronic allergic rhinitis due to other allergic trigger, unspecified seasonality  -     montelukast (SINGULAIR) 10 MG tablet; Take 1 tablet by mouth Every Evening.    Uncontrolled hypertension  Comments:  Better, continue current regimen    Mixed hyperlipidemia  Comments:  Labs ordered        Health Maintenance   Topic Date Due   • TDAP/TD VACCINES (1 - Tdap) 12/12/2018 (Originally 4/23/1994)   • LIPID PANEL  04/05/2019   • INFLUENZA VACCINE  Completed       Discussion/Summary  Impression: health maintenance visit, healthy adult male.   Currently, he eats a healthy diet and has an adequate exercise regimen.   Prostate cancer screening: PSA indicated at 45 or after, no fam hx.   Testicular cancer screening: monthly self testicular exam was advised.   Colorectal cancer screening:  fecal occult blood testing is needed every year and colonoscopy is indicated at 50 .   Screening lab work includes glucose, lipid profile and 25-hydroxyvitamin D.   The immunizations are up to date and Adv. to come in for Zostavax, if insurance covers it.   Advice and education were given regarding cardiovascular risk reduction and self skin examination.   Patient discussion: discussed with the patient.     Return in about 6 months (around 10/3/2018) for Next scheduled follow up.

## 2018-04-05 LAB
25(OH)D3 SERPL-MCNC: 31.8 NG/ML
ALBUMIN SERPL-MCNC: 4.5 G/DL (ref 3.2–4.8)
ALBUMIN/GLOB SERPL: 1.6 G/DL (ref 1.5–2.5)
ALP SERPL-CCNC: 74 U/L (ref 25–100)
ALT SERPL W P-5'-P-CCNC: 61 U/L (ref 7–40)
ANION GAP SERPL CALCULATED.3IONS-SCNC: 10 MMOL/L (ref 3–11)
ARTICHOKE IGE QN: 119 MG/DL (ref 0–130)
AST SERPL-CCNC: 37 U/L (ref 0–33)
BILIRUB SERPL-MCNC: 0.5 MG/DL (ref 0.3–1.2)
BUN BLD-MCNC: 22 MG/DL (ref 9–23)
BUN/CREAT SERPL: 15.7 (ref 7–25)
CALCIUM SPEC-SCNC: 9.8 MG/DL (ref 8.7–10.4)
CHLORIDE SERPL-SCNC: 93 MMOL/L (ref 99–109)
CHOLEST SERPL-MCNC: 183 MG/DL (ref 0–200)
CO2 SERPL-SCNC: 33 MMOL/L (ref 20–31)
CREAT BLD-MCNC: 1.4 MG/DL (ref 0.6–1.3)
DEPRECATED RDW RBC AUTO: 43.5 FL (ref 37–54)
ERYTHROCYTE [DISTWIDTH] IN BLOOD BY AUTOMATED COUNT: 14 % (ref 11.3–14.5)
GFR SERPL CREATININE-BSD FRML MDRD: 67 ML/MIN/1.73
GLOBULIN UR ELPH-MCNC: 2.9 GM/DL
GLUCOSE BLD-MCNC: 90 MG/DL (ref 70–100)
HCT VFR BLD AUTO: 43.2 % (ref 38.9–50.9)
HDLC SERPL-MCNC: 50 MG/DL (ref 40–60)
HGB BLD-MCNC: 14.4 G/DL (ref 13.1–17.5)
MCH RBC QN AUTO: 28.2 PG (ref 27–31)
MCHC RBC AUTO-ENTMCNC: 33.3 G/DL (ref 32–36)
MCV RBC AUTO: 84.7 FL (ref 80–99)
PLATELET # BLD AUTO: 205 10*3/MM3 (ref 150–450)
PMV BLD AUTO: 10.5 FL (ref 6–12)
POTASSIUM BLD-SCNC: 3.5 MMOL/L (ref 3.5–5.5)
PROT SERPL-MCNC: 7.4 G/DL (ref 5.7–8.2)
RBC # BLD AUTO: 5.1 10*6/MM3 (ref 4.2–5.76)
SODIUM BLD-SCNC: 136 MMOL/L (ref 132–146)
TRIGL SERPL-MCNC: 70 MG/DL (ref 0–150)
TSH SERPL DL<=0.05 MIU/L-ACNC: 0.47 MIU/ML (ref 0.35–5.35)
WBC NRBC COR # BLD: 4.98 10*3/MM3 (ref 3.5–10.8)

## 2018-04-05 PROCEDURE — 82306 VITAMIN D 25 HYDROXY: CPT | Performed by: INTERNAL MEDICINE

## 2018-04-05 PROCEDURE — 80050 GENERAL HEALTH PANEL: CPT | Performed by: INTERNAL MEDICINE

## 2018-04-05 PROCEDURE — 80061 LIPID PANEL: CPT | Performed by: INTERNAL MEDICINE

## 2018-05-15 ENCOUNTER — TELEPHONE (OUTPATIENT)
Dept: INTERNAL MEDICINE | Facility: CLINIC | Age: 43
End: 2018-05-15

## 2018-05-15 NOTE — TELEPHONE ENCOUNTER
PATIENT HAD SOME QUESTIONS ON HIS LAB RESULTS THAT HE RECEIVED AND WOULD LIKE TO GET A CALL BACK FROM DR DE GUZMAN OR HER MA. PATIENT'S CALL BACK NUMBER -[phone]

## 2018-05-15 NOTE — TELEPHONE ENCOUNTER
Spoke with patient regarding his questions about his labs. Went over kidney functions and results. Explained per Dr. Hanan patient just needs to increase fluid intake and stay away from pain meds. Pt verbalized understanding.

## 2018-06-29 DIAGNOSIS — I10 UNCONTROLLED HYPERTENSION: ICD-10-CM

## 2018-06-29 RX ORDER — CHLORTHALIDONE 25 MG/1
TABLET ORAL
Qty: 90 TABLET | Refills: 1 | Status: SHIPPED | OUTPATIENT
Start: 2018-06-29 | End: 2018-10-15 | Stop reason: SDUPTHER

## 2018-07-24 DIAGNOSIS — I10 UNCONTROLLED HYPERTENSION: ICD-10-CM

## 2018-07-25 RX ORDER — LISINOPRIL 10 MG/1
TABLET ORAL
Qty: 90 TABLET | Refills: 1 | Status: SHIPPED | OUTPATIENT
Start: 2018-07-25 | End: 2018-10-15 | Stop reason: SDUPTHER

## 2018-10-04 DIAGNOSIS — E78.2 MIXED HYPERLIPIDEMIA: ICD-10-CM

## 2018-10-05 RX ORDER — ATORVASTATIN CALCIUM 10 MG/1
10 TABLET, FILM COATED ORAL DAILY
Qty: 90 TABLET | Refills: 0 | Status: SHIPPED | OUTPATIENT
Start: 2018-10-05 | End: 2018-10-15 | Stop reason: SDUPTHER

## 2018-10-15 ENCOUNTER — OFFICE VISIT (OUTPATIENT)
Dept: INTERNAL MEDICINE | Facility: CLINIC | Age: 43
End: 2018-10-15

## 2018-10-15 VITALS
SYSTOLIC BLOOD PRESSURE: 116 MMHG | WEIGHT: 208.8 LBS | BODY MASS INDEX: 29.96 KG/M2 | HEART RATE: 81 BPM | OXYGEN SATURATION: 95 % | DIASTOLIC BLOOD PRESSURE: 82 MMHG

## 2018-10-15 DIAGNOSIS — E78.2 MIXED HYPERLIPIDEMIA: ICD-10-CM

## 2018-10-15 DIAGNOSIS — J45.40 MODERATE PERSISTENT ASTHMA WITHOUT COMPLICATION: ICD-10-CM

## 2018-10-15 DIAGNOSIS — M54.50 ACUTE LEFT-SIDED LOW BACK PAIN WITHOUT SCIATICA: Primary | ICD-10-CM

## 2018-10-15 DIAGNOSIS — E87.6 HYPOKALEMIA: ICD-10-CM

## 2018-10-15 DIAGNOSIS — I10 UNCONTROLLED HYPERTENSION: ICD-10-CM

## 2018-10-15 PROCEDURE — 90471 IMMUNIZATION ADMIN: CPT | Performed by: INTERNAL MEDICINE

## 2018-10-15 PROCEDURE — 96372 THER/PROPH/DIAG INJ SC/IM: CPT | Performed by: INTERNAL MEDICINE

## 2018-10-15 PROCEDURE — 90674 CCIIV4 VAC NO PRSV 0.5 ML IM: CPT | Performed by: INTERNAL MEDICINE

## 2018-10-15 PROCEDURE — 99214 OFFICE O/P EST MOD 30 MIN: CPT | Performed by: INTERNAL MEDICINE

## 2018-10-15 RX ORDER — ATORVASTATIN CALCIUM 10 MG/1
10 TABLET, FILM COATED ORAL DAILY
Qty: 90 TABLET | Refills: 1 | Status: SHIPPED | OUTPATIENT
Start: 2018-10-15 | End: 2019-05-09 | Stop reason: SDUPTHER

## 2018-10-15 RX ORDER — KETOROLAC TROMETHAMINE 30 MG/ML
60 INJECTION, SOLUTION INTRAMUSCULAR; INTRAVENOUS ONCE
Status: COMPLETED | OUTPATIENT
Start: 2018-10-15 | End: 2018-10-15

## 2018-10-15 RX ORDER — DICLOFENAC SODIUM 75 MG/1
75 TABLET, DELAYED RELEASE ORAL 2 TIMES DAILY
Qty: 40 TABLET | Refills: 1 | Status: SHIPPED | OUTPATIENT
Start: 2018-10-15 | End: 2020-07-28

## 2018-10-15 RX ORDER — CYCLOBENZAPRINE HCL 10 MG
10 TABLET ORAL 3 TIMES DAILY PRN
Qty: 30 TABLET | Refills: 3 | Status: SHIPPED | OUTPATIENT
Start: 2018-10-15 | End: 2021-09-01

## 2018-10-15 RX ORDER — CETIRIZINE HYDROCHLORIDE 10 MG/1
10 TABLET ORAL DAILY
Qty: 90 TABLET | Refills: 1 | Status: SHIPPED | OUTPATIENT
Start: 2018-10-15 | End: 2018-10-18 | Stop reason: CLARIF

## 2018-10-15 RX ORDER — BUDESONIDE AND FORMOTEROL FUMARATE DIHYDRATE 160; 4.5 UG/1; UG/1
2 AEROSOL RESPIRATORY (INHALATION)
Qty: 1 INHALER | Refills: 3 | Status: SHIPPED | OUTPATIENT
Start: 2018-10-15 | End: 2019-03-28 | Stop reason: SDUPTHER

## 2018-10-15 RX ORDER — POTASSIUM CHLORIDE 750 MG/1
10 TABLET, FILM COATED, EXTENDED RELEASE ORAL DAILY
Qty: 90 TABLET | Refills: 1 | Status: SHIPPED | OUTPATIENT
Start: 2018-10-15 | End: 2018-11-04 | Stop reason: SDUPTHER

## 2018-10-15 RX ORDER — ALBUTEROL SULFATE 90 UG/1
2 AEROSOL, METERED RESPIRATORY (INHALATION) EVERY 4 HOURS PRN
Qty: 1 INHALER | Refills: 3 | Status: SHIPPED | OUTPATIENT
Start: 2018-10-15 | End: 2019-07-05 | Stop reason: SDUPTHER

## 2018-10-15 RX ORDER — CHLORTHALIDONE 25 MG/1
25 TABLET ORAL DAILY
Qty: 90 TABLET | Refills: 1 | Status: SHIPPED | OUTPATIENT
Start: 2018-10-15 | End: 2019-04-22 | Stop reason: SDUPTHER

## 2018-10-15 RX ORDER — LISINOPRIL 10 MG/1
10 TABLET ORAL DAILY
Qty: 90 TABLET | Refills: 1 | Status: SHIPPED | OUTPATIENT
Start: 2018-10-15 | End: 2019-05-09 | Stop reason: SDUPTHER

## 2018-10-15 RX ORDER — MONTELUKAST SODIUM 10 MG/1
10 TABLET ORAL EVERY EVENING
Qty: 90 TABLET | Refills: 1 | Status: SHIPPED | OUTPATIENT
Start: 2018-10-15 | End: 2019-05-09 | Stop reason: SDUPTHER

## 2018-10-15 RX ADMIN — KETOROLAC TROMETHAMINE 60 MG: 30 INJECTION, SOLUTION INTRAMUSCULAR; INTRAVENOUS at 08:41

## 2018-10-15 NOTE — PROGRESS NOTES
Hypertension and Back Pain (left side.  Fell down stairs moving this weekend.)    Subjective   Tc Joshua is a 43 y.o. male is here today for follow-up.    History of Present Illness   Tc is here for a follow up on his HTN, HLP, , reports was moving his friend's furniture yesterday.  It fell on him, and now his back is sore, taking ibuprofen.  Asthma is stable,  Having trouble with deep breaths due to his back.    Current Outpatient Prescriptions:   •  albuterol (PROAIR HFA) 108 (90 Base) MCG/ACT inhaler, Inhale 2 puffs Every 4 (Four) Hours As Needed for Wheezing., Disp: 1 inhaler, Rfl: 3  •  atorvastatin (LIPITOR) 10 MG tablet, Take 1 tablet by mouth Daily., Disp: 90 tablet, Rfl: 1  •  budesonide-formoterol (SYMBICORT) 160-4.5 MCG/ACT inhaler, Inhale 2 puffs 2 (Two) Times a Day., Disp: 1 inhaler, Rfl: 3  •  cetirizine (zyrTEC) 10 MG tablet, Take 1 tablet by mouth Daily., Disp: 90 tablet, Rfl: 1  •  chlorthalidone (HYGROTON) 25 MG tablet, Take 1 tablet by mouth Daily., Disp: 90 tablet, Rfl: 1  •  lisinopril (PRINIVIL,ZESTRIL) 10 MG tablet, Take 1 tablet by mouth Daily., Disp: 90 tablet, Rfl: 1  •  montelukast (SINGULAIR) 10 MG tablet, Take 1 tablet by mouth Every Evening., Disp: 90 tablet, Rfl: 1  •  potassium chloride (KLOR-CON) 10 MEQ CR tablet, Take 1 tablet by mouth Daily., Disp: 90 tablet, Rfl: 1  •  cyclobenzaprine (FLEXERIL) 10 MG tablet, Take 1 tablet by mouth 3 (Three) Times a Day As Needed for Muscle Spasms., Disp: 30 tablet, Rfl: 3  •  diclofenac (VOLTAREN) 75 MG EC tablet, Take 1 tablet by mouth 2 (Two) Times a Day. With food, Disp: 40 tablet, Rfl: 1  No current facility-administered medications for this visit.       The following portions of the patient's history were reviewed and updated as appropriate: allergies, current medications, past family history, past medical history, past social history, past surgical history and problem list.    Review of Systems   Constitutional: Negative.  Negative  for chills and fever.   HENT: Negative for ear discharge, ear pain, sinus pressure and sore throat.    Respiratory: Negative for cough, chest tightness and shortness of breath.    Cardiovascular: Negative for chest pain, palpitations and leg swelling.   Gastrointestinal: Negative for diarrhea, nausea and vomiting.   Musculoskeletal: Positive for back pain and myalgias. Negative for arthralgias.   Neurological: Negative for dizziness, syncope and headaches.   Psychiatric/Behavioral: Negative for confusion and sleep disturbance.       Objective   /82   Pulse 81   Wt 94.7 kg (208 lb 12.8 oz)   SpO2 95% Comment: ra  BMI 29.96 kg/m²   Physical Exam   Constitutional: He is oriented to person, place, and time. He appears well-developed and well-nourished.   HENT:   Head: Normocephalic and atraumatic.   Right Ear: External ear normal.   Left Ear: External ear normal.   Mouth/Throat: No oropharyngeal exudate.   Eyes: Pupils are equal, round, and reactive to light. Conjunctivae are normal.   Neck: Neck supple. No thyromegaly present.   Cardiovascular: Normal rate and regular rhythm.    Pulmonary/Chest: Effort normal and breath sounds normal.   Abdominal: Soft. Bowel sounds are normal. He exhibits no distension. There is no tenderness.   Musculoskeletal: He exhibits tenderness (l3-4,5, left paraspinal area.). He exhibits no edema.   Neurological: He is alert and oriented to person, place, and time. No cranial nerve deficit.   Skin: Skin is warm and dry.   Psychiatric: He has a normal mood and affect. Judgment normal.   Nursing note and vitals reviewed.        Results for orders placed or performed in visit on 04/03/18   CBC (No Diff)   Result Value Ref Range    WBC 4.98 3.50 - 10.80 10*3/mm3    RBC 5.10 4.20 - 5.76 10*6/mm3    Hemoglobin 14.4 13.1 - 17.5 g/dL    Hematocrit 43.2 38.9 - 50.9 %    MCV 84.7 80.0 - 99.0 fL    MCH 28.2 27.0 - 31.0 pg    MCHC 33.3 32.0 - 36.0 g/dL    RDW 14.0 11.3 - 14.5 %    RDW-SD 43.5  37.0 - 54.0 fl    MPV 10.5 6.0 - 12.0 fL    Platelets 205 150 - 450 10*3/mm3   Comprehensive Metabolic Panel   Result Value Ref Range    Glucose 90 70 - 100 mg/dL    BUN 22 9 - 23 mg/dL    Creatinine 1.40 (H) 0.60 - 1.30 mg/dL    Sodium 136 132 - 146 mmol/L    Potassium 3.5 3.5 - 5.5 mmol/L    Chloride 93 (L) 99 - 109 mmol/L    CO2 33.0 (H) 20.0 - 31.0 mmol/L    Calcium 9.8 8.7 - 10.4 mg/dL    Total Protein 7.4 5.7 - 8.2 g/dL    Albumin 4.50 3.20 - 4.80 g/dL    ALT (SGPT) 61 (H) 7 - 40 U/L    AST (SGOT) 37 (H) 0 - 33 U/L    Alkaline Phosphatase 74 25 - 100 U/L    Total Bilirubin 0.5 0.3 - 1.2 mg/dL    eGFR  African Amer 67 >60 mL/min/1.73    Globulin 2.9 gm/dL    A/G Ratio 1.6 1.5 - 2.5 g/dL    BUN/Creatinine Ratio 15.7 7.0 - 25.0    Anion Gap 10.0 3.0 - 11.0 mmol/L   Lipid Panel   Result Value Ref Range    Total Cholesterol 183 0 - 200 mg/dL    Triglycerides 70 0 - 150 mg/dL    HDL Cholesterol 50 40 - 60 mg/dL    LDL Cholesterol  119 0 - 130 mg/dL   TSH   Result Value Ref Range    TSH 0.471 0.350 - 5.350 mIU/mL   Vitamin D 25 Hydroxy   Result Value Ref Range    25 Hydroxy, Vitamin D 31.8 ng/ml   POC Urinalysis Dipstick, Automated   Result Value Ref Range    Color Yellow Yellow, Straw, Dark Yellow, Hanna    Clarity, UA Clear Clear    Glucose, UA Negative Negative, 1000 mg/dL (3+) mg/dL    Bilirubin Negative Negative    Ketones, UA Negative Negative    Specific Gravity  1.015 1.005 - 1.030    Blood, UA Negative Negative    pH, Urine 6.5 5.0 - 8.0    Protein, POC Negative Negative mg/dL    Urobilinogen, UA 1 E.U./dL  (A) Normal    Leukocytes Negative Negative    Nitrite, UA Negative Negative             Assessment/Plan   Diagnoses and all orders for this visit:    Acute left-sided low back pain without sciatica  -     ketorolac (TORADOL) injection 60 mg; Inject 2 mL into the appropriate muscle as directed by prescriber 1 (One) Time.  -     cyclobenzaprine (FLEXERIL) 10 MG tablet; Take 1 tablet by mouth 3 (Three) Times  a Day As Needed for Muscle Spasms.  -     diclofenac (VOLTAREN) 75 MG EC tablet; Take 1 tablet by mouth 2 (Two) Times a Day. With food    Moderate persistent asthma without complication  -     albuterol (PROAIR HFA) 108 (90 Base) MCG/ACT inhaler; Inhale 2 puffs Every 4 (Four) Hours As Needed for Wheezing.  -     budesonide-formoterol (SYMBICORT) 160-4.5 MCG/ACT inhaler; Inhale 2 puffs 2 (Two) Times a Day.    Mixed hyperlipidemia  -     atorvastatin (LIPITOR) 10 MG tablet; Take 1 tablet by mouth Daily.  -     Comprehensive Metabolic Panel  -     Lipid Panel    Uncontrolled hypertension  -     chlorthalidone (HYGROTON) 25 MG tablet; Take 1 tablet by mouth Daily.  -     lisinopril (PRINIVIL,ZESTRIL) 10 MG tablet; Take 1 tablet by mouth Daily.  -     potassium chloride (KLOR-CON) 10 MEQ CR tablet; Take 1 tablet by mouth Daily.    Hypokalemia  -     potassium chloride (KLOR-CON) 10 MEQ CR tablet; Take 1 tablet by mouth Daily.    Other orders  -     cetirizine (zyrTEC) 10 MG tablet; Take 1 tablet by mouth Daily.  -     montelukast (SINGULAIR) 10 MG tablet; Take 1 tablet by mouth Every Evening.  -     Flucelvax Quad (Vial) =>4 yrs ([id number])    Declines hep a vaccine.  Continue ice, massage, and stretches,, cinb, will need xrays +/- PT.             Return in about 6 months (around 4/15/2019) for Annual.

## 2018-10-18 ENCOUNTER — TELEPHONE (OUTPATIENT)
Dept: INTERNAL MEDICINE | Facility: CLINIC | Age: 43
End: 2018-10-18

## 2018-10-18 RX ORDER — LORATADINE 10 MG/1
10 TABLET ORAL DAILY
Qty: 30 TABLET | Refills: 5 | Status: SHIPPED | OUTPATIENT
Start: 2018-10-18 | End: 2019-05-09 | Stop reason: SDUPTHER

## 2018-10-18 NOTE — TELEPHONE ENCOUNTER
Pt advised that due to insurance his cetirizine has been changed to loratadine.  Pt states verbal understanding and script has been sent.

## 2018-11-01 LAB
ALBUMIN SERPL-MCNC: 4.35 G/DL (ref 3.2–4.8)
ALBUMIN/GLOB SERPL: 1.7 G/DL (ref 1.5–2.5)
ALP SERPL-CCNC: 58 U/L (ref 25–100)
ALT SERPL W P-5'-P-CCNC: 45 U/L (ref 7–40)
ANION GAP SERPL CALCULATED.3IONS-SCNC: 4 MMOL/L (ref 3–11)
ARTICHOKE IGE QN: 121 MG/DL (ref 0–130)
AST SERPL-CCNC: 35 U/L (ref 0–33)
BILIRUB SERPL-MCNC: 0.5 MG/DL (ref 0.3–1.2)
BUN BLD-MCNC: 19 MG/DL (ref 9–23)
BUN/CREAT SERPL: 14.6 (ref 7–25)
CALCIUM SPEC-SCNC: 9.2 MG/DL (ref 8.7–10.4)
CHLORIDE SERPL-SCNC: 100 MMOL/L (ref 99–109)
CHOLEST SERPL-MCNC: 186 MG/DL (ref 0–200)
CO2 SERPL-SCNC: 32 MMOL/L (ref 20–31)
CREAT BLD-MCNC: 1.3 MG/DL (ref 0.6–1.3)
GFR SERPL CREATININE-BSD FRML MDRD: 73 ML/MIN/1.73
GLOBULIN UR ELPH-MCNC: 2.6 GM/DL
GLUCOSE BLD-MCNC: 91 MG/DL (ref 70–100)
HDLC SERPL-MCNC: 47 MG/DL (ref 40–60)
POTASSIUM BLD-SCNC: 3.1 MMOL/L (ref 3.5–5.5)
PROT SERPL-MCNC: 6.9 G/DL (ref 5.7–8.2)
SODIUM BLD-SCNC: 136 MMOL/L (ref 132–146)
TRIGL SERPL-MCNC: 102 MG/DL (ref 0–150)

## 2018-11-01 PROCEDURE — 80053 COMPREHEN METABOLIC PANEL: CPT | Performed by: INTERNAL MEDICINE

## 2018-11-01 PROCEDURE — 80061 LIPID PANEL: CPT | Performed by: INTERNAL MEDICINE

## 2018-11-04 RX ORDER — POTASSIUM CHLORIDE 750 MG/1
10 TABLET, FILM COATED, EXTENDED RELEASE ORAL DAILY
Qty: 180 TABLET | Refills: 1 | Status: SHIPPED | OUTPATIENT
Start: 2018-11-04 | End: 2019-05-17 | Stop reason: SDUPTHER

## 2018-11-05 ENCOUNTER — TELEPHONE (OUTPATIENT)
Dept: INTERNAL MEDICINE | Facility: CLINIC | Age: 43
End: 2018-11-05

## 2018-11-05 NOTE — TELEPHONE ENCOUNTER
PATIENT IS RETURNING OUR CALL. HE STATES HE MISSED A CALL FROM US AND THINKS ITS ABOUT HIS TEST RESULTS. PATIENTS NUMBER [phone]

## 2019-03-28 DIAGNOSIS — J45.40 MODERATE PERSISTENT ASTHMA WITHOUT COMPLICATION: ICD-10-CM

## 2019-03-28 RX ORDER — BUDESONIDE AND FORMOTEROL FUMARATE DIHYDRATE 160; 4.5 UG/1; UG/1
AEROSOL RESPIRATORY (INHALATION)
Qty: 10.2 G | Refills: 3 | Status: SHIPPED | OUTPATIENT
Start: 2019-03-28 | End: 2019-11-07 | Stop reason: SDUPTHER

## 2019-04-22 DIAGNOSIS — I10 UNCONTROLLED HYPERTENSION: ICD-10-CM

## 2019-04-23 RX ORDER — CHLORTHALIDONE 25 MG/1
TABLET ORAL
Qty: 90 TABLET | Refills: 1 | Status: SHIPPED | OUTPATIENT
Start: 2019-04-23 | End: 2019-11-26 | Stop reason: SDUPTHER

## 2019-05-09 ENCOUNTER — TELEPHONE (OUTPATIENT)
Dept: INTERNAL MEDICINE | Facility: CLINIC | Age: 44
End: 2019-05-09

## 2019-05-09 DIAGNOSIS — I10 UNCONTROLLED HYPERTENSION: ICD-10-CM

## 2019-05-09 DIAGNOSIS — E78.2 MIXED HYPERLIPIDEMIA: ICD-10-CM

## 2019-05-09 DIAGNOSIS — M54.50 ACUTE LEFT-SIDED LOW BACK PAIN WITHOUT SCIATICA: ICD-10-CM

## 2019-05-09 RX ORDER — ATORVASTATIN CALCIUM 10 MG/1
10 TABLET, FILM COATED ORAL DAILY
Qty: 90 TABLET | Refills: 1 | Status: SHIPPED | OUTPATIENT
Start: 2019-05-09 | End: 2019-11-26 | Stop reason: SDUPTHER

## 2019-05-09 RX ORDER — LISINOPRIL 10 MG/1
10 TABLET ORAL DAILY
Qty: 90 TABLET | Refills: 1 | Status: SHIPPED | OUTPATIENT
Start: 2019-05-09 | End: 2019-11-26 | Stop reason: SDUPTHER

## 2019-05-09 RX ORDER — MONTELUKAST SODIUM 10 MG/1
10 TABLET ORAL EVERY EVENING
Qty: 90 TABLET | Refills: 1 | Status: SHIPPED | OUTPATIENT
Start: 2019-05-09 | End: 2019-11-26 | Stop reason: SDUPTHER

## 2019-05-09 RX ORDER — LORATADINE 10 MG/1
10 TABLET ORAL DAILY
Qty: 90 TABLET | Refills: 1 | Status: SHIPPED | OUTPATIENT
Start: 2019-05-09 | End: 2019-11-26 | Stop reason: SDUPTHER

## 2019-05-17 ENCOUNTER — OFFICE VISIT (OUTPATIENT)
Dept: INTERNAL MEDICINE | Facility: CLINIC | Age: 44
End: 2019-05-17

## 2019-05-17 VITALS
BODY MASS INDEX: 30.71 KG/M2 | HEART RATE: 67 BPM | RESPIRATION RATE: 20 BRPM | OXYGEN SATURATION: 97 % | DIASTOLIC BLOOD PRESSURE: 76 MMHG | TEMPERATURE: 98.3 F | WEIGHT: 214 LBS | SYSTOLIC BLOOD PRESSURE: 120 MMHG

## 2019-05-17 DIAGNOSIS — J45.20 MILD INTERMITTENT ASTHMA WITHOUT COMPLICATION: ICD-10-CM

## 2019-05-17 DIAGNOSIS — I10 ESSENTIAL HYPERTENSION: Primary | ICD-10-CM

## 2019-05-17 DIAGNOSIS — E78.2 MIXED HYPERLIPIDEMIA: ICD-10-CM

## 2019-05-17 DIAGNOSIS — E87.6 HYPOKALEMIA: ICD-10-CM

## 2019-05-17 PROCEDURE — 99213 OFFICE O/P EST LOW 20 MIN: CPT | Performed by: INTERNAL MEDICINE

## 2019-05-17 RX ORDER — POTASSIUM CHLORIDE 750 MG/1
10 TABLET, FILM COATED, EXTENDED RELEASE ORAL DAILY
Qty: 180 TABLET | Refills: 1 | Status: SHIPPED | OUTPATIENT
Start: 2019-05-17 | End: 2019-11-26 | Stop reason: SDUPTHER

## 2019-05-17 NOTE — PROGRESS NOTES
Follow-up and Med Refill    Subjective   Tc Joshua is a 44 y.o. male is here today for follow-up.    History of Present Illness   Tc is here for a follow up on his HTN , HLP and Asthma. BP is much better. Taking meds regularly. Breathing stable, on the current regimen    Current Outpatient Medications:   •  albuterol (PROAIR HFA) 108 (90 Base) MCG/ACT inhaler, Inhale 2 puffs Every 4 (Four) Hours As Needed for Wheezing., Disp: 1 inhaler, Rfl: 3  •  atorvastatin (LIPITOR) 10 MG tablet, Take 1 tablet by mouth Daily., Disp: 90 tablet, Rfl: 1  •  chlorthalidone (HYGROTON) 25 MG tablet, take 1 tablet by mouth once daily, Disp: 90 tablet, Rfl: 1  •  cyclobenzaprine (FLEXERIL) 10 MG tablet, Take 1 tablet by mouth 3 (Three) Times a Day As Needed for Muscle Spasms., Disp: 30 tablet, Rfl: 3  •  diclofenac (VOLTAREN) 75 MG EC tablet, Take 1 tablet by mouth 2 (Two) Times a Day. With food, Disp: 40 tablet, Rfl: 1  •  lisinopril (PRINIVIL,ZESTRIL) 10 MG tablet, Take 1 tablet by mouth Daily., Disp: 90 tablet, Rfl: 1  •  loratadine (CLARITIN) 10 MG tablet, Take 1 tablet by mouth Daily., Disp: 90 tablet, Rfl: 1  •  montelukast (SINGULAIR) 10 MG tablet, Take 1 tablet by mouth Every Evening., Disp: 90 tablet, Rfl: 1  •  potassium chloride (KLOR-CON) 10 MEQ CR tablet, Take 1 tablet by mouth Daily., Disp: 180 tablet, Rfl: 1  •  SYMBICORT 160-4.5 MCG/ACT inhaler, inhale 2 puffs by mouth twice a day, Disp: 10.2 g, Rfl: 3      The following portions of the patient's history were reviewed and updated as appropriate: allergies, current medications, past family history, past medical history, past social history, past surgical history and problem list.    Review of Systems   Constitutional: Negative.  Negative for chills and fever.   HENT: Negative for ear discharge, ear pain, sinus pressure and sore throat.    Respiratory: Negative for cough, chest tightness and shortness of breath.    Cardiovascular: Negative for chest pain,  palpitations and leg swelling.   Gastrointestinal: Negative for diarrhea, nausea and vomiting.   Musculoskeletal: Negative for arthralgias, back pain and myalgias.   Neurological: Negative for dizziness, syncope and headaches.   Psychiatric/Behavioral: Negative for confusion and sleep disturbance.       Objective   /76 (BP Location: Left arm, Patient Position: Sitting, Cuff Size: Adult)   Pulse 67   Temp 98.3 °F (36.8 °C) (Oral)   Resp 20   Wt 97.1 kg (214 lb)   SpO2 97%   BMI 30.71 kg/m²   Physical Exam   Constitutional: He is oriented to person, place, and time. He appears well-developed and well-nourished.   HENT:   Head: Normocephalic and atraumatic.   Right Ear: External ear normal.   Left Ear: External ear normal.   Mouth/Throat: No oropharyngeal exudate.   Eyes: Conjunctivae are normal. Pupils are equal, round, and reactive to light.   Neck: Neck supple. No thyromegaly present.   Cardiovascular: Normal rate, regular rhythm and intact distal pulses.   Pulmonary/Chest: Effort normal and breath sounds normal.   Abdominal: Soft. Bowel sounds are normal. He exhibits no distension. There is no tenderness.   Musculoskeletal: He exhibits no edema.   Neurological: He is alert and oriented to person, place, and time. No cranial nerve deficit.   Skin: Skin is warm and dry.   Psychiatric: He has a normal mood and affect. Judgment normal.   Nursing note and vitals reviewed.        Results for orders placed or performed during the hospital encounter of 02/10/19   POCT Rapid Strep A   Result Value Ref Range    Rapid Strep A Screen Negative Negative, VALID, INVALID, Not Performed    Internal Control Passed Passed    Lot Number [id number]     Expiration Date 5/20              Assessment/Plan   Diagnoses and all orders for this visit:    Essential hypertension  -     Comprehensive Metabolic Panel    Mixed hyperlipidemia  -     Comprehensive Metabolic Panel  -     Lipid Panel    Hypokalemia  -     potassium chloride  (KLOR-CON) 10 MEQ CR tablet; Take 1 tablet by mouth Daily.  -     Comprehensive Metabolic Panel    Mild intermittent asthma without complication  Comments:  doing well, continue current regimen.    will come back fasting for labs.         Declines Hep A vaccine.    Return in about 6 months (around 11/17/2019) for Annual.

## 2019-07-05 DIAGNOSIS — J45.40 MODERATE PERSISTENT ASTHMA WITHOUT COMPLICATION: ICD-10-CM

## 2019-07-05 RX ORDER — ALBUTEROL SULFATE 90 UG/1
AEROSOL, METERED RESPIRATORY (INHALATION)
Qty: 18 G | Refills: 3 | Status: SHIPPED | OUTPATIENT
Start: 2019-07-05 | End: 2020-07-28 | Stop reason: SDUPTHER

## 2019-09-27 ENCOUNTER — TELEPHONE (OUTPATIENT)
Dept: INTERNAL MEDICINE | Facility: CLINIC | Age: 44
End: 2019-09-27

## 2019-09-27 NOTE — TELEPHONE ENCOUNTER
Pt was out of town and lost his medication bottles, wanted scripts called in.  Called pharmacy and pt had at least 2 months of all his meds left on file and is time for them to be filled, pharmacy will get them ready for pt to .  Pt advised and stated verbal understanding.

## 2019-11-07 ENCOUNTER — TELEPHONE (OUTPATIENT)
Dept: INTERNAL MEDICINE | Facility: CLINIC | Age: 44
End: 2019-11-07

## 2019-11-07 DIAGNOSIS — J45.40 MODERATE PERSISTENT ASTHMA WITHOUT COMPLICATION: ICD-10-CM

## 2019-11-07 RX ORDER — BUDESONIDE AND FORMOTEROL FUMARATE DIHYDRATE 160; 4.5 UG/1; UG/1
2 AEROSOL RESPIRATORY (INHALATION) 2 TIMES DAILY
Qty: 10.2 G | Refills: 3 | Status: SHIPPED | OUTPATIENT
Start: 2019-11-07 | End: 2019-11-11

## 2019-11-11 NOTE — TELEPHONE ENCOUNTER
Pharmacy called stating the pt's insurance formalary no longer includes Symbicort. So needs a pa for Symbicort or the pharmacist stated she knows they cover Advair.

## 2019-11-26 ENCOUNTER — OFFICE VISIT (OUTPATIENT)
Dept: INTERNAL MEDICINE | Facility: CLINIC | Age: 44
End: 2019-11-26

## 2019-11-26 VITALS
DIASTOLIC BLOOD PRESSURE: 76 MMHG | SYSTOLIC BLOOD PRESSURE: 112 MMHG | RESPIRATION RATE: 16 BRPM | BODY MASS INDEX: 31.07 KG/M2 | HEIGHT: 70 IN | HEART RATE: 79 BPM | WEIGHT: 217 LBS | OXYGEN SATURATION: 98 %

## 2019-11-26 DIAGNOSIS — Z23 FLU VACCINE NEED: ICD-10-CM

## 2019-11-26 DIAGNOSIS — E87.6 HYPOKALEMIA: ICD-10-CM

## 2019-11-26 DIAGNOSIS — Z00.00 ANNUAL PHYSICAL EXAM: Primary | ICD-10-CM

## 2019-11-26 DIAGNOSIS — J45.40 MODERATE PERSISTENT ASTHMA WITHOUT COMPLICATION: ICD-10-CM

## 2019-11-26 DIAGNOSIS — Z23 NEED FOR VACCINATION: ICD-10-CM

## 2019-11-26 DIAGNOSIS — E78.2 MIXED HYPERLIPIDEMIA: ICD-10-CM

## 2019-11-26 DIAGNOSIS — I10 UNCONTROLLED HYPERTENSION: ICD-10-CM

## 2019-11-26 LAB
25(OH)D3 SERPL-MCNC: 27.4 NG/ML (ref 30–100)
ALBUMIN SERPL-MCNC: 4.5 G/DL (ref 3.5–5.2)
ALBUMIN/GLOB SERPL: 1.3 G/DL
ALP SERPL-CCNC: 62 U/L (ref 39–117)
ALT SERPL W P-5'-P-CCNC: 35 U/L (ref 1–41)
ANION GAP SERPL CALCULATED.3IONS-SCNC: 11.3 MMOL/L (ref 5–15)
AST SERPL-CCNC: 25 U/L (ref 1–40)
BILIRUB BLD-MCNC: NEGATIVE MG/DL
BILIRUB SERPL-MCNC: 0.3 MG/DL (ref 0.2–1.2)
BUN BLD-MCNC: 16 MG/DL (ref 6–20)
BUN/CREAT SERPL: 12.8 (ref 7–25)
CALCIUM SPEC-SCNC: 9.8 MG/DL (ref 8.6–10.5)
CHLORIDE SERPL-SCNC: 98 MMOL/L (ref 98–107)
CHOLEST SERPL-MCNC: 183 MG/DL (ref 0–200)
CLARITY, POC: CLEAR
CO2 SERPL-SCNC: 30.7 MMOL/L (ref 22–29)
COLOR UR: YELLOW
CREAT BLD-MCNC: 1.25 MG/DL (ref 0.76–1.27)
DEPRECATED RDW RBC AUTO: 42.9 FL (ref 37–54)
ERYTHROCYTE [DISTWIDTH] IN BLOOD BY AUTOMATED COUNT: 13.8 % (ref 12.3–15.4)
GFR SERPL CREATININE-BSD FRML MDRD: 76 ML/MIN/1.73
GLOBULIN UR ELPH-MCNC: 3.6 GM/DL
GLUCOSE BLD-MCNC: 91 MG/DL (ref 65–99)
GLUCOSE UR STRIP-MCNC: NEGATIVE MG/DL
HCT VFR BLD AUTO: 46.9 % (ref 37.5–51)
HDLC SERPL-MCNC: 43 MG/DL (ref 40–60)
HGB BLD-MCNC: 15.4 G/DL (ref 13–17.7)
KETONES UR QL: NEGATIVE
LDLC SERPL CALC-MCNC: 122 MG/DL (ref 0–100)
LDLC/HDLC SERPL: 2.84 {RATIO}
LEUKOCYTE EST, POC: NEGATIVE
MCH RBC QN AUTO: 27.7 PG (ref 26.6–33)
MCHC RBC AUTO-ENTMCNC: 32.8 G/DL (ref 31.5–35.7)
MCV RBC AUTO: 84.5 FL (ref 79–97)
NITRITE UR-MCNC: NEGATIVE MG/ML
PH UR: 6.5 [PH] (ref 5–8)
PLATELET # BLD AUTO: 231 10*3/MM3 (ref 140–450)
PMV BLD AUTO: 10.4 FL (ref 6–12)
POTASSIUM BLD-SCNC: 3.7 MMOL/L (ref 3.5–5.2)
PROT SERPL-MCNC: 8.1 G/DL (ref 6–8.5)
PROT UR STRIP-MCNC: NEGATIVE MG/DL
PSA SERPL-MCNC: 1.2 NG/ML (ref 0–4)
RBC # BLD AUTO: 5.55 10*6/MM3 (ref 4.14–5.8)
RBC # UR STRIP: NEGATIVE /UL
SODIUM BLD-SCNC: 140 MMOL/L (ref 136–145)
SP GR UR: 1.01 (ref 1–1.03)
TRIGL SERPL-MCNC: 90 MG/DL (ref 0–150)
TSH SERPL DL<=0.05 MIU/L-ACNC: 0.7 UIU/ML (ref 0.27–4.2)
UROBILINOGEN UR QL: NORMAL
VLDLC SERPL-MCNC: 18 MG/DL (ref 5–40)
WBC NRBC COR # BLD: 4.89 10*3/MM3 (ref 3.4–10.8)

## 2019-11-26 PROCEDURE — G0103 PSA SCREENING: HCPCS | Performed by: INTERNAL MEDICINE

## 2019-11-26 PROCEDURE — 90715 TDAP VACCINE 7 YRS/> IM: CPT | Performed by: INTERNAL MEDICINE

## 2019-11-26 PROCEDURE — 99396 PREV VISIT EST AGE 40-64: CPT | Performed by: INTERNAL MEDICINE

## 2019-11-26 PROCEDURE — 90472 IMMUNIZATION ADMIN EACH ADD: CPT | Performed by: INTERNAL MEDICINE

## 2019-11-26 PROCEDURE — 81003 URINALYSIS AUTO W/O SCOPE: CPT | Performed by: INTERNAL MEDICINE

## 2019-11-26 PROCEDURE — 80050 GENERAL HEALTH PANEL: CPT | Performed by: INTERNAL MEDICINE

## 2019-11-26 PROCEDURE — 90471 IMMUNIZATION ADMIN: CPT | Performed by: INTERNAL MEDICINE

## 2019-11-26 PROCEDURE — 80061 LIPID PANEL: CPT | Performed by: INTERNAL MEDICINE

## 2019-11-26 PROCEDURE — 90674 CCIIV4 VAC NO PRSV 0.5 ML IM: CPT | Performed by: INTERNAL MEDICINE

## 2019-11-26 PROCEDURE — 82306 VITAMIN D 25 HYDROXY: CPT | Performed by: INTERNAL MEDICINE

## 2019-11-26 RX ORDER — LORATADINE 10 MG/1
10 TABLET ORAL DAILY
Qty: 90 TABLET | Refills: 1 | Status: SHIPPED | OUTPATIENT
Start: 2019-11-26 | End: 2020-07-28 | Stop reason: SDUPTHER

## 2019-11-26 RX ORDER — LISINOPRIL 10 MG/1
10 TABLET ORAL DAILY
Qty: 90 TABLET | Refills: 1 | Status: SHIPPED | OUTPATIENT
Start: 2019-11-26 | End: 2020-07-13

## 2019-11-26 RX ORDER — MONTELUKAST SODIUM 10 MG/1
10 TABLET ORAL EVERY EVENING
Qty: 90 TABLET | Refills: 1 | Status: SHIPPED | OUTPATIENT
Start: 2019-11-26 | End: 2020-07-13

## 2019-11-26 RX ORDER — POTASSIUM CHLORIDE 750 MG/1
10 TABLET, FILM COATED, EXTENDED RELEASE ORAL DAILY
Qty: 180 TABLET | Refills: 1 | Status: SHIPPED | OUTPATIENT
Start: 2019-11-26 | End: 2020-07-28 | Stop reason: SDUPTHER

## 2019-11-26 RX ORDER — CHLORTHALIDONE 25 MG/1
25 TABLET ORAL DAILY
Qty: 90 TABLET | Refills: 1 | Status: SHIPPED | OUTPATIENT
Start: 2019-11-26 | End: 2020-07-13

## 2019-11-26 RX ORDER — ATORVASTATIN CALCIUM 10 MG/1
10 TABLET, FILM COATED ORAL DAILY
Qty: 90 TABLET | Refills: 1 | Status: SHIPPED | OUTPATIENT
Start: 2019-11-26 | End: 2020-07-13

## 2019-11-26 NOTE — PROGRESS NOTES
Chief Complaint   Patient presents with   • Annual Exam   • Hypertension       History of Present Illness  HM, Adult Male: The patient is being seen for a health maintenance evaluation. The last health maintenance visit was 1 year(s) ago.   Social History: Household members include spouse- and 3 children, 16 yo twin boys, 11 yo girl . He is  with children. Work status: working full time. The patient has never smoked cigarettes. He reports occasional drinking alcohol. He has never used illicit drugs.   General Health: The patient's health is described as good. He has regular dental visits. He denies vision problems. He denies hearing loss. Immunizations status: up to date.   Lifestyle:. He consumes a diverse and healthy diet. He does not have any weight concerns. He exercises regularly- 5 times/ week. He does not use tobacco. He reportcs  alcohol use. He denies drug use.   Screening: Cancer screening reviewed and current.   Metabolic screening reviewed and current.   Risk screening reviewed and current.          Review of Systems   Constitutional: Negative for chills and fatigue.   HENT: Negative for congestion, ear pain and sore throat.    Eyes: Negative for pain, redness and visual disturbance.   Respiratory: Negative for cough and shortness of breath.    Cardiovascular: Negative for chest pain, palpitations and leg swelling.   Gastrointestinal: Negative for abdominal pain, diarrhea and nausea.   Endocrine: Negative for cold intolerance and heat intolerance.   Genitourinary: Negative for flank pain and urgency.   Musculoskeletal: Negative for arthralgias and gait problem.   Skin: Negative for pallor and rash.   Neurological: Negative for dizziness, weakness and headaches.   Psychiatric/Behavioral: Negative for dysphoric mood and sleep disturbance. The patient is not nervous/anxious.        Patient Active Problem List   Diagnosis   • Allergic rhinitis   • Asthma   • Essential hypertension   • Mixed  "hyperlipidemia   • Hypokalemia       Social History     Socioeconomic History   • Marital status:      Spouse name: Not on file   • Number of children: Not on file   • Years of education: Not on file   • Highest education level: Not on file   Tobacco Use   • Smoking status: Former Smoker     Types: Cigarettes   • Smokeless tobacco: Never Used   Substance and Sexual Activity   • Alcohol use: Yes     Alcohol/week: 1.8 oz     Types: 3 Cans of beer per week   • Drug use: No   • Sexual activity: Defer       Current Outpatient Medications on File Prior to Visit   Medication Sig Dispense Refill   • cyclobenzaprine (FLEXERIL) 10 MG tablet Take 1 tablet by mouth 3 (Three) Times a Day As Needed for Muscle Spasms. 30 tablet 3   • diclofenac (VOLTAREN) 75 MG EC tablet Take 1 tablet by mouth 2 (Two) Times a Day. With food 40 tablet 1   • fluticasone-salmeterol (ADVAIR DISKUS) 250-50 MCG/DOSE DISKUS Inhale 1 puff 2 (Two) Times a Day. 60 each 5   • VENTOLIN  (90 Base) MCG/ACT inhaler inhale 2 puffs by mouth every 4 hours if needed for wheezing 18 g 3     No current facility-administered medications on file prior to visit.        No Known Allergies    /76   Pulse 79   Resp 16   Ht 177.8 cm (70\")   Wt 98.4 kg (217 lb)   SpO2 98%   BMI 31.14 kg/m²          The following portions of the patient's history were reviewed and updated as appropriate: allergies, current medications, past family history, past medical history, past social history, past surgical history and problem list.    Physical Exam   Constitutional: He is oriented to person, place, and time. He appears well-developed and well-nourished.   HENT:   Head: Normocephalic and atraumatic.   Right Ear: External ear normal.   Left Ear: External ear normal.   Mouth/Throat: No oropharyngeal exudate.   Eyes: Conjunctivae are normal. Pupils are equal, round, and reactive to light.   Neck: Neck supple. No thyromegaly present.   Cardiovascular: Normal rate, " regular rhythm and intact distal pulses.   Pulmonary/Chest: Effort normal and breath sounds normal.   Abdominal: Soft. Bowel sounds are normal. He exhibits no distension. There is no tenderness.   Musculoskeletal: He exhibits no edema.   Neurological: He is alert and oriented to person, place, and time. No cranial nerve deficit.   Skin: Skin is warm and dry.   Psychiatric: He has a normal mood and affect. Judgment normal.   Nursing note and vitals reviewed.      Results for orders placed or performed in visit on 11/26/19   CBC (No Diff)   Result Value Ref Range    WBC 4.89 3.40 - 10.80 10*3/mm3    RBC 5.55 4.14 - 5.80 10*6/mm3    Hemoglobin 15.4 13.0 - 17.7 g/dL    Hematocrit 46.9 37.5 - 51.0 %    MCV 84.5 79.0 - 97.0 fL    MCH 27.7 26.6 - 33.0 pg    MCHC 32.8 31.5 - 35.7 g/dL    RDW 13.8 12.3 - 15.4 %    RDW-SD 42.9 37.0 - 54.0 fl    MPV 10.4 6.0 - 12.0 fL    Platelets 231 140 - 450 10*3/mm3   Comprehensive Metabolic Panel   Result Value Ref Range    Glucose 91 65 - 99 mg/dL    BUN 16 6 - 20 mg/dL    Creatinine 1.25 0.76 - 1.27 mg/dL    Sodium 140 136 - 145 mmol/L    Potassium 3.7 3.5 - 5.2 mmol/L    Chloride 98 98 - 107 mmol/L    CO2 30.7 (H) 22.0 - 29.0 mmol/L    Calcium 9.8 8.6 - 10.5 mg/dL    Total Protein 8.1 6.0 - 8.5 g/dL    Albumin 4.50 3.50 - 5.20 g/dL    ALT (SGPT) 35 1 - 41 U/L    AST (SGOT) 25 1 - 40 U/L    Alkaline Phosphatase 62 39 - 117 U/L    Total Bilirubin 0.3 0.2 - 1.2 mg/dL    eGFR  African Amer 76 >60 mL/min/1.73    Globulin 3.6 gm/dL    A/G Ratio 1.3 g/dL    BUN/Creatinine Ratio 12.8 7.0 - 25.0    Anion Gap 11.3 5.0 - 15.0 mmol/L   Lipid Panel   Result Value Ref Range    Total Cholesterol 183 0 - 200 mg/dL    Triglycerides 90 0 - 150 mg/dL    HDL Cholesterol 43 40 - 60 mg/dL    LDL Cholesterol  122 (H) 0 - 100 mg/dL    VLDL Cholesterol 18 5 - 40 mg/dL    LDL/HDL Ratio 2.84    TSH   Result Value Ref Range    TSH 0.698 0.270 - 4.200 uIU/mL   Vitamin D 25 Hydroxy   Result Value Ref Range    25  Hydroxy, Vitamin D 27.4 (L) 30.0 - 100.0 ng/ml   PSA Screen   Result Value Ref Range    PSA 1.200 0.000 - 4.000 ng/mL   POCT urinalysis dipstick, automated   Result Value Ref Range    Color Yellow Yellow, Straw, Dark Yellow, Hanna    Clarity, UA Clear Clear    Specific Gravity  1.015 1.005 - 1.030    pH, Urine 6.5 5.0 - 8.0    Leukocytes Negative Negative    Nitrite, UA Negative Negative    Protein, POC Negative Negative mg/dL    Glucose, UA Negative Negative, 1000 mg/dL (3+) mg/dL    Ketones, UA Negative Negative    Urobilinogen, UA Normal Normal    Bilirubin Negative Negative    Blood, UA Negative Negative       Tc was seen today for annual exam and hypertension.    Diagnoses and all orders for this visit:    Annual physical exam  -     CBC (No Diff)  -     Comprehensive Metabolic Panel  -     Lipid Panel  -     TSH  -     Vitamin D 25 Hydroxy  -     PSA Screen  -     Cancel: Tdap Vaccine Greater Than or Equal To 6yo IM  -     POCT urinalysis dipstick, automated    Flu vaccine need    Mixed hyperlipidemia  -     atorvastatin (LIPITOR) 10 MG tablet; Take 1 tablet by mouth Daily.    Uncontrolled hypertension  -     chlorthalidone (HYGROTON) 25 MG tablet; Take 1 tablet by mouth Daily.  -     lisinopril (PRINIVIL,ZESTRIL) 10 MG tablet; Take 1 tablet by mouth Daily.    Moderate persistent asthma without complication  -     loratadine (CLARITIN) 10 MG tablet; Take 1 tablet by mouth Daily.  -     montelukast (SINGULAIR) 10 MG tablet; Take 1 tablet by mouth Every Evening.    Hypokalemia  -     potassium chloride (KLOR-CON) 10 MEQ CR tablet; Take 1 tablet by mouth Daily.    Need for vaccination    Other orders  -     Flucelvax Quad=>4Years ([id number])  -     Tdap Vaccine Greater Than or Equal To 6yo IM        Health Maintenance   Topic Date Due   • LIPID PANEL  11/26/2020   • ANNUAL PHYSICAL  11/27/2020   • TDAP/TD VACCINES (2 - Td) 11/26/2029   • INFLUENZA VACCINE  Completed       Discussion/Summary  Impression: health  maintenance visit, healthy adult male.   Currently, he eats a healthy diet and has an adequate exercise regimen.   Prostate cancer screening: PSA was ordered.   Testicular cancer screening: monthly self testicular exam was advised.   Colorectal cancer screening: fecal occult blood testing is needed every year and colonoscopy is due at 50, discussed doing at 45.   Screening lab work includes glucose, lipid profile and 25-hydroxyvitamin D.   The immunizations are per orders.   Advice and education were given regarding cardiovascular risk reduction, healthy dietary habits, Seatbelt and helmet use and self skin examination.        Return in about 6 months (around 5/26/2020) for Next scheduled follow up.

## 2020-07-11 DIAGNOSIS — E78.2 MIXED HYPERLIPIDEMIA: ICD-10-CM

## 2020-07-11 DIAGNOSIS — J45.40 MODERATE PERSISTENT ASTHMA WITHOUT COMPLICATION: ICD-10-CM

## 2020-07-11 DIAGNOSIS — I10 UNCONTROLLED HYPERTENSION: ICD-10-CM

## 2020-07-13 RX ORDER — MONTELUKAST SODIUM 10 MG/1
10 TABLET ORAL EVERY EVENING
Qty: 90 TABLET | Refills: 0 | Status: SHIPPED | OUTPATIENT
Start: 2020-07-13 | End: 2020-07-28 | Stop reason: SDUPTHER

## 2020-07-13 RX ORDER — ATORVASTATIN CALCIUM 10 MG/1
TABLET, FILM COATED ORAL
Qty: 90 TABLET | Refills: 0 | Status: SHIPPED | OUTPATIENT
Start: 2020-07-13 | End: 2020-07-28 | Stop reason: SDUPTHER

## 2020-07-13 RX ORDER — LISINOPRIL 10 MG/1
TABLET ORAL
Qty: 90 TABLET | Refills: 0 | Status: SHIPPED | OUTPATIENT
Start: 2020-07-13 | End: 2020-07-28 | Stop reason: SDUPTHER

## 2020-07-13 RX ORDER — CHLORTHALIDONE 25 MG/1
TABLET ORAL
Qty: 90 TABLET | Refills: 0 | Status: SHIPPED | OUTPATIENT
Start: 2020-07-13 | End: 2020-07-28 | Stop reason: SDUPTHER

## 2020-07-28 ENCOUNTER — OFFICE VISIT (OUTPATIENT)
Dept: INTERNAL MEDICINE | Facility: CLINIC | Age: 45
End: 2020-07-28

## 2020-07-28 ENCOUNTER — LAB (OUTPATIENT)
Dept: LAB | Facility: HOSPITAL | Age: 45
End: 2020-07-28

## 2020-07-28 VITALS
TEMPERATURE: 97.1 F | HEIGHT: 70 IN | SYSTOLIC BLOOD PRESSURE: 132 MMHG | BODY MASS INDEX: 29.23 KG/M2 | HEART RATE: 71 BPM | OXYGEN SATURATION: 98 % | WEIGHT: 204.2 LBS | DIASTOLIC BLOOD PRESSURE: 82 MMHG

## 2020-07-28 DIAGNOSIS — E87.6 HYPOKALEMIA: ICD-10-CM

## 2020-07-28 DIAGNOSIS — Z11.59 SCREENING FOR VIRAL DISEASE: ICD-10-CM

## 2020-07-28 DIAGNOSIS — J45.40 MODERATE PERSISTENT ASTHMA WITHOUT COMPLICATION: ICD-10-CM

## 2020-07-28 DIAGNOSIS — I10 UNCONTROLLED HYPERTENSION: ICD-10-CM

## 2020-07-28 DIAGNOSIS — E78.2 MIXED HYPERLIPIDEMIA: ICD-10-CM

## 2020-07-28 DIAGNOSIS — Z11.59 SCREENING FOR VIRAL DISEASE: Primary | ICD-10-CM

## 2020-07-28 LAB
ALBUMIN SERPL-MCNC: 4.5 G/DL (ref 3.5–5.2)
ALBUMIN/GLOB SERPL: 1.5 G/DL
ALP SERPL-CCNC: 62 U/L (ref 39–117)
ALT SERPL W P-5'-P-CCNC: 53 U/L (ref 1–41)
ANION GAP SERPL CALCULATED.3IONS-SCNC: 9.9 MMOL/L (ref 5–15)
AST SERPL-CCNC: 38 U/L (ref 1–40)
BILIRUB SERPL-MCNC: 0.2 MG/DL (ref 0–1.2)
BUN SERPL-MCNC: 14 MG/DL (ref 6–20)
BUN/CREAT SERPL: 13.2 (ref 7–25)
CALCIUM SPEC-SCNC: 10.3 MG/DL (ref 8.6–10.5)
CHLORIDE SERPL-SCNC: 99 MMOL/L (ref 98–107)
CHOLEST SERPL-MCNC: 186 MG/DL (ref 0–200)
CO2 SERPL-SCNC: 31.1 MMOL/L (ref 22–29)
CREAT SERPL-MCNC: 1.06 MG/DL (ref 0.76–1.27)
GFR SERPL CREATININE-BSD FRML MDRD: 92 ML/MIN/1.73
GLOBULIN UR ELPH-MCNC: 3.1 GM/DL
GLUCOSE SERPL-MCNC: 103 MG/DL (ref 65–99)
HCV AB SER DONR QL: NORMAL
HDLC SERPL-MCNC: 52 MG/DL (ref 40–60)
LDLC SERPL CALC-MCNC: 114 MG/DL (ref 0–100)
LDLC/HDLC SERPL: 2.19 {RATIO}
POTASSIUM SERPL-SCNC: 3.6 MMOL/L (ref 3.5–5.2)
PROT SERPL-MCNC: 7.6 G/DL (ref 6–8.5)
SODIUM SERPL-SCNC: 140 MMOL/L (ref 136–145)
TRIGL SERPL-MCNC: 100 MG/DL (ref 0–150)
TSH SERPL DL<=0.05 MIU/L-ACNC: 0.54 UIU/ML (ref 0.27–4.2)
VLDLC SERPL-MCNC: 20 MG/DL (ref 5–40)

## 2020-07-28 PROCEDURE — 86803 HEPATITIS C AB TEST: CPT | Performed by: INTERNAL MEDICINE

## 2020-07-28 PROCEDURE — 99214 OFFICE O/P EST MOD 30 MIN: CPT | Performed by: INTERNAL MEDICINE

## 2020-07-28 PROCEDURE — 80061 LIPID PANEL: CPT | Performed by: INTERNAL MEDICINE

## 2020-07-28 PROCEDURE — 84443 ASSAY THYROID STIM HORMONE: CPT | Performed by: INTERNAL MEDICINE

## 2020-07-28 PROCEDURE — 80053 COMPREHEN METABOLIC PANEL: CPT | Performed by: INTERNAL MEDICINE

## 2020-07-28 RX ORDER — CHLORTHALIDONE 25 MG/1
25 TABLET ORAL DAILY
Qty: 90 TABLET | Refills: 1 | Status: SHIPPED | OUTPATIENT
Start: 2020-07-28 | End: 2021-01-27 | Stop reason: SDUPTHER

## 2020-07-28 RX ORDER — LISINOPRIL 10 MG/1
10 TABLET ORAL DAILY
Qty: 90 TABLET | Refills: 1 | Status: SHIPPED | OUTPATIENT
Start: 2020-07-28 | End: 2021-01-27 | Stop reason: SDUPTHER

## 2020-07-28 RX ORDER — POTASSIUM CHLORIDE 750 MG/1
10 TABLET, FILM COATED, EXTENDED RELEASE ORAL DAILY
Qty: 180 TABLET | Refills: 1 | Status: SHIPPED | OUTPATIENT
Start: 2020-07-28 | End: 2021-01-27 | Stop reason: SDUPTHER

## 2020-07-28 RX ORDER — ALBUTEROL SULFATE 90 UG/1
2 AEROSOL, METERED RESPIRATORY (INHALATION) EVERY 4 HOURS PRN
Qty: 18 G | Refills: 3 | Status: SHIPPED | OUTPATIENT
Start: 2020-07-28 | End: 2021-01-27 | Stop reason: SDUPTHER

## 2020-07-28 RX ORDER — LORATADINE 10 MG/1
10 TABLET ORAL DAILY
Qty: 90 TABLET | Refills: 1 | Status: SHIPPED | OUTPATIENT
Start: 2020-07-28 | End: 2020-08-10

## 2020-07-28 RX ORDER — ATORVASTATIN CALCIUM 10 MG/1
10 TABLET, FILM COATED ORAL DAILY
Qty: 90 TABLET | Refills: 1 | Status: SHIPPED | OUTPATIENT
Start: 2020-07-28 | End: 2021-01-27 | Stop reason: SDUPTHER

## 2020-07-28 RX ORDER — MONTELUKAST SODIUM 10 MG/1
10 TABLET ORAL EVERY EVENING
Qty: 90 TABLET | Refills: 1 | Status: SHIPPED | OUTPATIENT
Start: 2020-07-28 | End: 2021-01-27 | Stop reason: SDUPTHER

## 2020-07-28 NOTE — PROGRESS NOTES
Hypertension    Subjective   Tc Joshua is a 45 y.o. male is here today for follow-up.    History of Present Illness   Here for a follow up on his HTN, hlp and asthma. Was on vacation last appt.  Back x 2 weeks, denies any sxs of URI.  No med side effects, doing well.    Current Outpatient Medications:   •  albuterol sulfate HFA (Ventolin HFA) 108 (90 Base) MCG/ACT inhaler, Inhale 2 puffs Every 4 (Four) Hours As Needed for Wheezing., Disp: 18 g, Rfl: 3  •  atorvastatin (LIPITOR) 10 MG tablet, Take 1 tablet by mouth Daily., Disp: 90 tablet, Rfl: 1  •  chlorthalidone (HYGROTON) 25 MG tablet, Take 1 tablet by mouth Daily., Disp: 90 tablet, Rfl: 1  •  cyclobenzaprine (FLEXERIL) 10 MG tablet, Take 1 tablet by mouth 3 (Three) Times a Day As Needed for Muscle Spasms., Disp: 30 tablet, Rfl: 3  •  lisinopril (PRINIVIL,ZESTRIL) 10 MG tablet, Take 1 tablet by mouth Daily., Disp: 90 tablet, Rfl: 1  •  loratadine (Claritin) 10 MG tablet, Take 1 tablet by mouth Daily., Disp: 90 tablet, Rfl: 1  •  montelukast (SINGULAIR) 10 MG tablet, Take 1 tablet by mouth Every Evening., Disp: 90 tablet, Rfl: 1  •  potassium chloride (KLOR-CON) 10 MEQ CR tablet, Take 1 tablet by mouth Daily., Disp: 180 tablet, Rfl: 1  •  WIXELA INHUB 250-50 MCG/DOSE DISKUS, INHALE 1 PUFF BY MOUTH TWICE A DAY RINSE MOUTH AFTER USE, Disp: 60 each, Rfl: 5      The following portions of the patient's history were reviewed and updated as appropriate: allergies, current medications, past family history, past medical history, past social history, past surgical history and problem list.    Review of Systems   Constitutional: Negative.  Negative for chills and fever.   HENT: Negative for ear discharge, ear pain, sinus pressure and sore throat.    Respiratory: Negative for cough, chest tightness and shortness of breath.    Cardiovascular: Negative for chest pain, palpitations and leg swelling.   Gastrointestinal: Negative for diarrhea, nausea and vomiting.  "  Musculoskeletal: Negative for arthralgias, back pain and myalgias.   Neurological: Negative for dizziness, syncope and headaches.   Psychiatric/Behavioral: Negative for confusion and sleep disturbance.       Objective   /82   Pulse 71   Temp 97.1 °F (36.2 °C)   Ht 177.8 cm (70\")   Wt 92.6 kg (204 lb 3.2 oz)   SpO2 98% Comment: ra  BMI 29.30 kg/m²   Physical Exam   Constitutional: He is oriented to person, place, and time. He appears well-developed and well-nourished.   HENT:   Head: Normocephalic and atraumatic.   Right Ear: External ear normal.   Left Ear: External ear normal.   Mouth/Throat: No oropharyngeal exudate.   Eyes: Pupils are equal, round, and reactive to light. Conjunctivae are normal.   Neck: Neck supple. No thyromegaly present.   Cardiovascular: Normal rate, regular rhythm and intact distal pulses.   Pulmonary/Chest: Effort normal and breath sounds normal.   Abdominal: Soft. Bowel sounds are normal. He exhibits no distension. There is no tenderness.   Musculoskeletal: He exhibits no edema.   Neurological: He is alert and oriented to person, place, and time. No cranial nerve deficit.   Skin: Skin is warm and dry.   Psychiatric: He has a normal mood and affect. Judgment normal.   Nursing note and vitals reviewed.        Results for orders placed or performed in visit on 11/26/19   CBC (No Diff)   Result Value Ref Range    WBC 4.89 3.40 - 10.80 10*3/mm3    RBC 5.55 4.14 - 5.80 10*6/mm3    Hemoglobin 15.4 13.0 - 17.7 g/dL    Hematocrit 46.9 37.5 - 51.0 %    MCV 84.5 79.0 - 97.0 fL    MCH 27.7 26.6 - 33.0 pg    MCHC 32.8 31.5 - 35.7 g/dL    RDW 13.8 12.3 - 15.4 %    RDW-SD 42.9 37.0 - 54.0 fl    MPV 10.4 6.0 - 12.0 fL    Platelets 231 140 - 450 10*3/mm3   Comprehensive Metabolic Panel   Result Value Ref Range    Glucose 91 65 - 99 mg/dL    BUN 16 6 - 20 mg/dL    Creatinine 1.25 0.76 - 1.27 mg/dL    Sodium 140 136 - 145 mmol/L    Potassium 3.7 3.5 - 5.2 mmol/L    Chloride 98 98 - 107 mmol/L    " CO2 30.7 (H) 22.0 - 29.0 mmol/L    Calcium 9.8 8.6 - 10.5 mg/dL    Total Protein 8.1 6.0 - 8.5 g/dL    Albumin 4.50 3.50 - 5.20 g/dL    ALT (SGPT) 35 1 - 41 U/L    AST (SGOT) 25 1 - 40 U/L    Alkaline Phosphatase 62 39 - 117 U/L    Total Bilirubin 0.3 0.2 - 1.2 mg/dL    eGFR  African Amer 76 >60 mL/min/1.73    Globulin 3.6 gm/dL    A/G Ratio 1.3 g/dL    BUN/Creatinine Ratio 12.8 7.0 - 25.0    Anion Gap 11.3 5.0 - 15.0 mmol/L   Lipid Panel   Result Value Ref Range    Total Cholesterol 183 0 - 200 mg/dL    Triglycerides 90 0 - 150 mg/dL    HDL Cholesterol 43 40 - 60 mg/dL    LDL Cholesterol  122 (H) 0 - 100 mg/dL    VLDL Cholesterol 18 5 - 40 mg/dL    LDL/HDL Ratio 2.84    TSH   Result Value Ref Range    TSH 0.698 0.270 - 4.200 uIU/mL   Vitamin D 25 Hydroxy   Result Value Ref Range    25 Hydroxy, Vitamin D 27.4 (L) 30.0 - 100.0 ng/ml   PSA Screen   Result Value Ref Range    PSA 1.200 0.000 - 4.000 ng/mL   POCT urinalysis dipstick, automated   Result Value Ref Range    Color Yellow Yellow, Straw, Dark Yellow, Hanna    Clarity, UA Clear Clear    Specific Gravity  1.015 1.005 - 1.030    pH, Urine 6.5 5.0 - 8.0    Leukocytes Negative Negative    Nitrite, UA Negative Negative    Protein, POC Negative Negative mg/dL    Glucose, UA Negative Negative, 1000 mg/dL (3+) mg/dL    Ketones, UA Negative Negative    Urobilinogen, UA Normal Normal    Bilirubin Negative Negative    Blood, UA Negative Negative             Assessment/Plan   Diagnoses and all orders for this visit:    Screening for viral disease  -     Hepatitis C Antibody; Future    Mixed hyperlipidemia  -     atorvastatin (LIPITOR) 10 MG tablet; Take 1 tablet by mouth Daily.  -     Comprehensive Metabolic Panel; Future  -     Lipid Panel; Future  -     TSH; Future    Uncontrolled hypertension  -     chlorthalidone (HYGROTON) 25 MG tablet; Take 1 tablet by mouth Daily.  -     lisinopril (PRINIVIL,ZESTRIL) 10 MG tablet; Take 1 tablet by mouth Daily.    Moderate persistent  asthma without complication  -     loratadine (Claritin) 10 MG tablet; Take 1 tablet by mouth Daily.  -     montelukast (SINGULAIR) 10 MG tablet; Take 1 tablet by mouth Every Evening.  -     albuterol sulfate HFA (Ventolin HFA) 108 (90 Base) MCG/ACT inhaler; Inhale 2 puffs Every 4 (Four) Hours As Needed for Wheezing.    Hypokalemia  -     potassium chloride (KLOR-CON) 10 MEQ CR tablet; Take 1 tablet by mouth Daily.  -     Comprehensive Metabolic Panel; Future      BP better on recheck, continue current meds. Monitor for symptoms.           Return in about 6 months (around 1/28/2021) for Annual.

## 2020-08-10 DIAGNOSIS — J45.40 MODERATE PERSISTENT ASTHMA WITHOUT COMPLICATION: ICD-10-CM

## 2020-08-10 RX ORDER — LORATADINE 10 MG/1
TABLET ORAL
Qty: 90 TABLET | Refills: 1 | Status: SHIPPED | OUTPATIENT
Start: 2020-08-10 | End: 2021-01-27 | Stop reason: SDUPTHER

## 2021-01-27 ENCOUNTER — OFFICE VISIT (OUTPATIENT)
Dept: INTERNAL MEDICINE | Facility: CLINIC | Age: 46
End: 2021-01-27

## 2021-01-27 VITALS
HEART RATE: 76 BPM | BODY MASS INDEX: 29.92 KG/M2 | WEIGHT: 209 LBS | HEIGHT: 70 IN | SYSTOLIC BLOOD PRESSURE: 126 MMHG | DIASTOLIC BLOOD PRESSURE: 90 MMHG | TEMPERATURE: 96.4 F | OXYGEN SATURATION: 97 %

## 2021-01-27 DIAGNOSIS — J45.40 MODERATE PERSISTENT ASTHMA WITHOUT COMPLICATION: ICD-10-CM

## 2021-01-27 DIAGNOSIS — E78.2 MIXED HYPERLIPIDEMIA: ICD-10-CM

## 2021-01-27 DIAGNOSIS — Z12.5 PROSTATE CANCER SCREENING: ICD-10-CM

## 2021-01-27 DIAGNOSIS — Z00.00 GENERAL MEDICAL EXAM: Primary | ICD-10-CM

## 2021-01-27 DIAGNOSIS — Z12.11 SCREENING FOR COLON CANCER: ICD-10-CM

## 2021-01-27 DIAGNOSIS — E87.6 HYPOKALEMIA: ICD-10-CM

## 2021-01-27 DIAGNOSIS — I10 UNCONTROLLED HYPERTENSION: ICD-10-CM

## 2021-01-27 LAB
BILIRUB BLD-MCNC: NEGATIVE MG/DL
CLARITY, POC: CLEAR
COLOR UR: YELLOW
GLUCOSE UR STRIP-MCNC: NEGATIVE MG/DL
KETONES UR QL: NEGATIVE
LEUKOCYTE EST, POC: NEGATIVE
NITRITE UR-MCNC: NEGATIVE MG/ML
PH UR: 6 [PH] (ref 5–8)
PROT UR STRIP-MCNC: NEGATIVE MG/DL
RBC # UR STRIP: NEGATIVE /UL
SP GR UR: 1.02 (ref 1–1.03)
UROBILINOGEN UR QL: NORMAL

## 2021-01-27 PROCEDURE — 99396 PREV VISIT EST AGE 40-64: CPT | Performed by: INTERNAL MEDICINE

## 2021-01-27 PROCEDURE — 81003 URINALYSIS AUTO W/O SCOPE: CPT | Performed by: INTERNAL MEDICINE

## 2021-01-27 RX ORDER — MONTELUKAST SODIUM 10 MG/1
10 TABLET ORAL EVERY EVENING
Qty: 90 TABLET | Refills: 1 | Status: SHIPPED | OUTPATIENT
Start: 2021-01-27 | End: 2021-09-01 | Stop reason: SDUPTHER

## 2021-01-27 RX ORDER — POTASSIUM CHLORIDE 750 MG/1
10 TABLET, FILM COATED, EXTENDED RELEASE ORAL DAILY
Qty: 180 TABLET | Refills: 1 | Status: SHIPPED | OUTPATIENT
Start: 2021-01-27 | End: 2021-09-01 | Stop reason: SDUPTHER

## 2021-01-27 RX ORDER — LISINOPRIL 10 MG/1
10 TABLET ORAL DAILY
Qty: 90 TABLET | Refills: 1 | Status: SHIPPED | OUTPATIENT
Start: 2021-01-27 | End: 2021-07-24

## 2021-01-27 RX ORDER — ALBUTEROL SULFATE 90 UG/1
2 AEROSOL, METERED RESPIRATORY (INHALATION) EVERY 4 HOURS PRN
Qty: 18 G | Refills: 3 | Status: SHIPPED | OUTPATIENT
Start: 2021-01-27 | End: 2021-08-13

## 2021-01-27 RX ORDER — ATORVASTATIN CALCIUM 10 MG/1
10 TABLET, FILM COATED ORAL DAILY
Qty: 90 TABLET | Refills: 1 | Status: SHIPPED | OUTPATIENT
Start: 2021-01-27 | End: 2021-07-24

## 2021-01-27 RX ORDER — LORATADINE 10 MG/1
10 TABLET ORAL DAILY
Qty: 90 TABLET | Refills: 1 | Status: SHIPPED | OUTPATIENT
Start: 2021-01-27 | End: 2021-09-01 | Stop reason: SDUPTHER

## 2021-01-27 RX ORDER — CHLORTHALIDONE 25 MG/1
25 TABLET ORAL DAILY
Qty: 90 TABLET | Refills: 1 | Status: SHIPPED | OUTPATIENT
Start: 2021-01-27 | End: 2021-07-24

## 2021-01-27 RX ORDER — BUDESONIDE AND FORMOTEROL FUMARATE DIHYDRATE 160; 4.5 UG/1; UG/1
2 AEROSOL RESPIRATORY (INHALATION)
Qty: 10 G | Refills: 5 | Status: SHIPPED | OUTPATIENT
Start: 2021-01-27 | End: 2021-08-13

## 2021-01-27 NOTE — PROGRESS NOTES
Chief Complaint   Patient presents with   • Annual Exam   • Stress   • Asthma     did better on symbicort       History of Present Illness  HM, Adult Male:  The patient is being seen for a health maintenance evaluation. The last health maintenance visit was 1 year(s) ago.   Social History: Household members include spouse, . He is  with 3 children. Work status: working full time, now in [employer] [facility]. The patient has never smoked cigarettes. He reports never drinking alcohol. He has never used illicit drugs.   General Health: The patient's health is described as good. He has regular dental visits. He denies vision problems. He denies hearing loss. Immunizations status: up to date.   Lifestyle:. He consumes a diverse and healthy diet. He does not have any weight concerns. He exercises regularly, but has cut down going to the gym. He does not use tobacco. He denies alcohol use. He denies drug use.   Screening: Cancer screening reviewed and current.   Metabolic screening reviewed and current.   Risk screening reviewed and current.     Rough times- twins in college @ [employer], all online, and now want to quit.  Youngest dtr  in 8th , having a hard time, and in therapy.        Review of Systems   Constitutional: Negative for chills and fatigue.   HENT: Negative for congestion, ear pain and sore throat.    Eyes: Negative for pain, redness and visual disturbance.   Respiratory: Positive for shortness of breath. Negative for cough.    Cardiovascular: Negative for chest pain, palpitations and leg swelling.   Gastrointestinal: Negative for abdominal pain, diarrhea and nausea.   Endocrine: Negative for cold intolerance and heat intolerance.   Genitourinary: Negative for flank pain and urgency.   Musculoskeletal: Negative for arthralgias and gait problem.   Skin: Negative for pallor and rash.   Neurological: Negative for dizziness, weakness and headaches.   Psychiatric/Behavioral: Negative for dysphoric mood and sleep  "disturbance. The patient is not nervous/anxious.        Patient Active Problem List   Diagnosis   • Allergic rhinitis   • Asthma   • Essential hypertension   • Mixed hyperlipidemia   • Hypokalemia       Social History     Socioeconomic History   • Marital status:      Spouse name: Not on file   • Number of children: Not on file   • Years of education: Not on file   • Highest education level: Not on file   Tobacco Use   • Smoking status: Former Smoker     Types: Cigarettes   • Smokeless tobacco: Never Used   Substance and Sexual Activity   • Alcohol use: Yes     Alcohol/week: 3.0 standard drinks     Types: 3 Cans of beer per week   • Drug use: No   • Sexual activity: Defer       Current Outpatient Medications on File Prior to Visit   Medication Sig Dispense Refill   • cyclobenzaprine (FLEXERIL) 10 MG tablet Take 1 tablet by mouth 3 (Three) Times a Day As Needed for Muscle Spasms. 30 tablet 3     No current facility-administered medications on file prior to visit.        No Known Allergies    /90   Pulse 76   Temp 96.4 °F (35.8 °C)   Ht 177.8 cm (70\")   Wt 94.8 kg (209 lb)   SpO2 97%   BMI 29.99 kg/m²          The following portions of the patient's history were reviewed and updated as appropriate: allergies, current medications, past family history, past medical history, past social history, past surgical history and problem list.    Physical Exam  Vitals signs and nursing note reviewed.   Constitutional:       Appearance: Normal appearance. He is well-developed.   HENT:      Head: Normocephalic and atraumatic.      Right Ear: External ear normal.      Left Ear: External ear normal.      Mouth/Throat:      Pharynx: No oropharyngeal exudate.   Eyes:      General: No scleral icterus.     Conjunctiva/sclera: Conjunctivae normal.      Pupils: Pupils are equal, round, and reactive to light.   Neck:      Musculoskeletal: Normal range of motion and neck supple.      Thyroid: No thyromegaly.      Vascular: " No carotid bruit.   Cardiovascular:      Rate and Rhythm: Normal rate and regular rhythm.      Pulses: Normal pulses.      Heart sounds: Normal heart sounds. No murmur. No friction rub. No gallop.    Pulmonary:      Effort: Pulmonary effort is normal.      Breath sounds: Normal breath sounds. No rales.   Chest:      Chest wall: No tenderness.   Abdominal:      General: Bowel sounds are normal. There is no distension.      Palpations: Abdomen is soft. There is no mass.      Tenderness: There is no abdominal tenderness. There is no right CVA tenderness, left CVA tenderness or rebound.      Hernia: No hernia is present.   Musculoskeletal: Normal range of motion.      Right lower leg: No edema.      Left lower leg: No edema.   Skin:     General: Skin is warm and dry.      Findings: No lesion or rash.   Neurological:      Mental Status: He is alert and oriented to person, place, and time.      Cranial Nerves: No cranial nerve deficit.      Sensory: No sensory deficit.      Coordination: Coordination normal.      Gait: Gait normal.      Deep Tendon Reflexes: Reflexes normal.   Psychiatric:         Mood and Affect: Mood normal.         Behavior: Behavior normal.         Judgment: Judgment normal.         Results for orders placed or performed in visit on 01/27/21   POCT urinalysis dipstick, automated    Specimen: Urine   Result Value Ref Range    Color Yellow Yellow, Straw, Dark Yellow, Hanna    Clarity, UA Clear Clear    Specific Gravity  1.025 1.005 - 1.030    pH, Urine 6.0 5.0 - 8.0    Leukocytes Negative Negative    Nitrite, UA Negative Negative    Protein, POC Negative Negative mg/dL    Glucose, UA Negative Negative, 1000 mg/dL (3+) mg/dL    Ketones, UA Negative Negative    Urobilinogen, UA Normal Normal    Bilirubin Negative Negative    Blood, UA Negative Negative       Diagnoses and all orders for this visit:    1. General medical exam (Primary)  -     POCT urinalysis dipstick, automated  -     CBC (No Diff);  Future  -     Comprehensive Metabolic Panel; Future  -     Hemoglobin A1c; Future  -     TSH; Future  -     PSA Screen; Future  -     Lipid Panel; Future  -     Vitamin D 25 Hydroxy; Future    2. Moderate persistent asthma without complication  -     albuterol sulfate HFA (Ventolin HFA) 108 (90 Base) MCG/ACT inhaler; Inhale 2 puffs Every 4 (Four) Hours As Needed for Wheezing.  Dispense: 18 g; Refill: 3  -     loratadine (CLARITIN) 10 MG tablet; Take 1 tablet by mouth Daily.  Dispense: 90 tablet; Refill: 1  -     montelukast (SINGULAIR) 10 MG tablet; Take 1 tablet by mouth Every Evening.  Dispense: 90 tablet; Refill: 1  -     Discontinue: fluticasone-salmeterol (Wixela Inhub) 250-50 MCG/DOSE DISKUS; Inhale 1 puff 2 (Two) Times a Day.  Dispense: 60 each; Refill: 5  -     budesonide-formoterol (Symbicort) 160-4.5 MCG/ACT inhaler; Inhale 2 puffs 2 (Two) Times a Day.  Dispense: 10 g; Refill: 5    3. Mixed hyperlipidemia  -     atorvastatin (LIPITOR) 10 MG tablet; Take 1 tablet by mouth Daily.  Dispense: 90 tablet; Refill: 1    4. Uncontrolled hypertension  -     chlorthalidone (HYGROTON) 25 MG tablet; Take 1 tablet by mouth Daily.  Dispense: 90 tablet; Refill: 1  -     lisinopril (PRINIVIL,ZESTRIL) 10 MG tablet; Take 1 tablet by mouth Daily.  Dispense: 90 tablet; Refill: 1    5. Hypokalemia  -     potassium chloride (KLOR-CON) 10 MEQ CR tablet; Take 1 tablet by mouth Daily.  Dispense: 180 tablet; Refill: 1    6. Prostate cancer screening  -     PSA Screen; Future    7. Screening for colon cancer  -     Ambulatory Referral For Screening Colonoscopy        Health Maintenance   Topic Date Due   • COLONOSCOPY  1975   • Pneumococcal Vaccine 0-64 (1 of 1 - PPSV23) 04/23/1981   • INFLUENZA VACCINE  08/01/2020   • LIPID PANEL  07/28/2021   • ANNUAL PHYSICAL  01/28/2022   • TDAP/TD VACCINES (2 - Td) 11/26/2029   • HEPATITIS C SCREENING  Completed   • MENINGOCOCCAL VACCINE  Aged Out       Discussion/Summary  Impression:  health maintenance visit, healthy adult male.   Currently, he eats a healthy diet and has an adequate exercise regimen.   Prostate cancer screening: PSA was ordered.   Testicular cancer screening: monthly self testicular exam was advised.   Colorectal cancer screening: fecal occult blood testing is needed every year and colonoscopy is due- ordered.   Screening lab work includes glucose, lipid profile and 25-hydroxyvitamin D.   The immunizations are up to date.   Advice and education were given regarding cardiovascular risk reduction, healthy dietary habits, Seatbelt and helmet use and self skin examination.        Return in about 6 months (around 7/27/2021) for Next scheduled follow up.

## 2021-03-31 DIAGNOSIS — Z12.11 SCREENING FOR COLON CANCER: Primary | ICD-10-CM

## 2021-04-06 ENCOUNTER — APPOINTMENT (OUTPATIENT)
Dept: PREADMISSION TESTING | Facility: HOSPITAL | Age: 46
End: 2021-04-06

## 2021-04-06 PROCEDURE — C9803 HOPD COVID-19 SPEC COLLECT: HCPCS

## 2021-04-06 PROCEDURE — U0004 COV-19 TEST NON-CDC HGH THRU: HCPCS

## 2021-04-07 LAB — SARS-COV-2 RNA NOSE QL NAA+PROBE: NOT DETECTED

## 2021-04-09 ENCOUNTER — OUTSIDE FACILITY SERVICE (OUTPATIENT)
Dept: GASTROENTEROLOGY | Facility: CLINIC | Age: 46
End: 2021-04-09

## 2021-04-09 PROCEDURE — 45385 COLONOSCOPY W/LESION REMOVAL: CPT | Performed by: INTERNAL MEDICINE

## 2021-04-09 PROCEDURE — 45380 COLONOSCOPY AND BIOPSY: CPT | Performed by: INTERNAL MEDICINE

## 2021-04-09 PROCEDURE — 88305 TISSUE EXAM BY PATHOLOGIST: CPT | Performed by: INTERNAL MEDICINE

## 2021-04-12 ENCOUNTER — LAB REQUISITION (OUTPATIENT)
Dept: LAB | Facility: HOSPITAL | Age: 46
End: 2021-04-12

## 2021-04-12 DIAGNOSIS — Z12.11 ENCOUNTER FOR SCREENING FOR MALIGNANT NEOPLASM OF COLON: ICD-10-CM

## 2021-04-13 LAB
CYTO UR: NORMAL
LAB AP CASE REPORT: NORMAL
LAB AP CLINICAL INFORMATION: NORMAL
PATH REPORT.FINAL DX SPEC: NORMAL
PATH REPORT.GROSS SPEC: NORMAL

## 2021-07-24 DIAGNOSIS — J45.40 MODERATE PERSISTENT ASTHMA WITHOUT COMPLICATION: ICD-10-CM

## 2021-07-24 DIAGNOSIS — E78.2 MIXED HYPERLIPIDEMIA: ICD-10-CM

## 2021-07-24 DIAGNOSIS — I10 UNCONTROLLED HYPERTENSION: ICD-10-CM

## 2021-07-24 RX ORDER — LISINOPRIL 10 MG/1
10 TABLET ORAL DAILY
Qty: 90 TABLET | Refills: 1 | Status: SHIPPED | OUTPATIENT
Start: 2021-07-24 | End: 2022-01-20

## 2021-07-24 RX ORDER — CHLORTHALIDONE 25 MG/1
25 TABLET ORAL DAILY
Qty: 90 TABLET | Refills: 1 | Status: SHIPPED | OUTPATIENT
Start: 2021-07-24 | End: 2022-01-20

## 2021-07-24 RX ORDER — ATORVASTATIN CALCIUM 10 MG/1
10 TABLET, FILM COATED ORAL DAILY
Qty: 90 TABLET | Refills: 1 | Status: SHIPPED | OUTPATIENT
Start: 2021-07-24 | End: 2022-01-20

## 2021-07-24 NOTE — TELEPHONE ENCOUNTER
Last Office Visit: 01/27/21  Next Office Visit:09/01/21    Labs completed in past 6 months? yes  Labs completed in past year? yes    Last Refill Date: 01/27/21  Quantity:90  Refills:1    Pharmacy:     Please review pended refill request for any changes needed on refills or quantities. Thank you!

## 2021-08-12 DIAGNOSIS — J45.40 MODERATE PERSISTENT ASTHMA WITHOUT COMPLICATION: ICD-10-CM

## 2021-08-13 RX ORDER — ALBUTEROL SULFATE 90 UG/1
AEROSOL, METERED RESPIRATORY (INHALATION)
Qty: 18 G | Refills: 3 | Status: SHIPPED | OUTPATIENT
Start: 2021-08-13 | End: 2022-03-08 | Stop reason: SDUPTHER

## 2021-08-13 RX ORDER — BUDESONIDE AND FORMOTEROL FUMARATE DIHYDRATE 160; 4.5 UG/1; UG/1
AEROSOL RESPIRATORY (INHALATION)
Qty: 10.2 G | Refills: 1 | Status: SHIPPED | OUTPATIENT
Start: 2021-08-13 | End: 2021-10-26

## 2021-08-13 RX ORDER — ALBUTEROL SULFATE 90 UG/1
AEROSOL, METERED RESPIRATORY (INHALATION)
Qty: 18 G | Refills: 3 | Status: CANCELLED | OUTPATIENT
Start: 2021-08-13

## 2021-09-01 ENCOUNTER — LAB (OUTPATIENT)
Dept: LAB | Facility: HOSPITAL | Age: 46
End: 2021-09-01

## 2021-09-01 ENCOUNTER — OFFICE VISIT (OUTPATIENT)
Dept: INTERNAL MEDICINE | Facility: CLINIC | Age: 46
End: 2021-09-01

## 2021-09-01 ENCOUNTER — HOSPITAL ENCOUNTER (OUTPATIENT)
Dept: GENERAL RADIOLOGY | Facility: HOSPITAL | Age: 46
Discharge: HOME | End: 2021-09-01

## 2021-09-01 VITALS
HEART RATE: 74 BPM | DIASTOLIC BLOOD PRESSURE: 80 MMHG | HEIGHT: 70 IN | BODY MASS INDEX: 28.72 KG/M2 | SYSTOLIC BLOOD PRESSURE: 122 MMHG | WEIGHT: 200.6 LBS | OXYGEN SATURATION: 96 %

## 2021-09-01 DIAGNOSIS — M70.21 OLECRANON BURSITIS OF RIGHT ELBOW: Primary | ICD-10-CM

## 2021-09-01 DIAGNOSIS — E87.6 HYPOKALEMIA: ICD-10-CM

## 2021-09-01 DIAGNOSIS — S42.401A CLOSED FRACTURE OF RIGHT ELBOW, INITIAL ENCOUNTER: ICD-10-CM

## 2021-09-01 DIAGNOSIS — M70.21 OLECRANON BURSITIS OF RIGHT ELBOW: ICD-10-CM

## 2021-09-01 DIAGNOSIS — M21.611 BUNION OF RIGHT FOOT: ICD-10-CM

## 2021-09-01 DIAGNOSIS — J45.40 MODERATE PERSISTENT ASTHMA WITHOUT COMPLICATION: ICD-10-CM

## 2021-09-01 DIAGNOSIS — Z12.5 PROSTATE CANCER SCREENING: ICD-10-CM

## 2021-09-01 DIAGNOSIS — Z00.00 GENERAL MEDICAL EXAM: ICD-10-CM

## 2021-09-01 LAB
25(OH)D3 SERPL-MCNC: 38.8 NG/ML
ALBUMIN SERPL-MCNC: 4.6 G/DL (ref 3.5–5.2)
ALBUMIN/GLOB SERPL: 1.4 G/DL
ALP SERPL-CCNC: 72 U/L (ref 39–117)
ALT SERPL W P-5'-P-CCNC: 40 U/L (ref 1–41)
ANION GAP SERPL CALCULATED.3IONS-SCNC: 9.8 MMOL/L (ref 5–15)
AST SERPL-CCNC: 32 U/L (ref 1–40)
BILIRUB SERPL-MCNC: 0.4 MG/DL (ref 0–1.2)
BUN SERPL-MCNC: 15 MG/DL (ref 6–20)
BUN/CREAT SERPL: 12.4 (ref 7–25)
CALCIUM SPEC-SCNC: 9.8 MG/DL (ref 8.6–10.5)
CHLORIDE SERPL-SCNC: 98 MMOL/L (ref 98–107)
CHOLEST SERPL-MCNC: 203 MG/DL (ref 0–200)
CO2 SERPL-SCNC: 31.2 MMOL/L (ref 22–29)
CREAT SERPL-MCNC: 1.21 MG/DL (ref 0.76–1.27)
DEPRECATED RDW RBC AUTO: 44 FL (ref 37–54)
ERYTHROCYTE [DISTWIDTH] IN BLOOD BY AUTOMATED COUNT: 13.8 % (ref 12.3–15.4)
GFR SERPL CREATININE-BSD FRML MDRD: 78 ML/MIN/1.73
GLOBULIN UR ELPH-MCNC: 3.4 GM/DL
GLUCOSE SERPL-MCNC: 59 MG/DL (ref 65–99)
HBA1C MFR BLD: 5.4 % (ref 4.8–5.6)
HCT VFR BLD AUTO: 48 % (ref 37.5–51)
HDLC SERPL-MCNC: 51 MG/DL (ref 40–60)
HGB BLD-MCNC: 15.8 G/DL (ref 13–17.7)
LDLC SERPL CALC-MCNC: 133 MG/DL (ref 0–100)
LDLC/HDLC SERPL: 2.58 {RATIO}
MCH RBC QN AUTO: 28.6 PG (ref 26.6–33)
MCHC RBC AUTO-ENTMCNC: 32.9 G/DL (ref 31.5–35.7)
MCV RBC AUTO: 86.8 FL (ref 79–97)
PLATELET # BLD AUTO: 203 10*3/MM3 (ref 140–450)
PMV BLD AUTO: 10.4 FL (ref 6–12)
POTASSIUM SERPL-SCNC: 3.8 MMOL/L (ref 3.5–5.2)
PROT SERPL-MCNC: 8 G/DL (ref 6–8.5)
PSA SERPL-MCNC: 0.85 NG/ML (ref 0–4)
RBC # BLD AUTO: 5.53 10*6/MM3 (ref 4.14–5.8)
SODIUM SERPL-SCNC: 139 MMOL/L (ref 136–145)
TRIGL SERPL-MCNC: 103 MG/DL (ref 0–150)
TSH SERPL DL<=0.05 MIU/L-ACNC: 0.46 UIU/ML (ref 0.27–4.2)
VLDLC SERPL-MCNC: 19 MG/DL (ref 5–40)
WBC # BLD AUTO: 4.43 10*3/MM3 (ref 3.4–10.8)

## 2021-09-01 PROCEDURE — 80061 LIPID PANEL: CPT

## 2021-09-01 PROCEDURE — 73070 X-RAY EXAM OF ELBOW: CPT

## 2021-09-01 PROCEDURE — 82306 VITAMIN D 25 HYDROXY: CPT

## 2021-09-01 PROCEDURE — G0103 PSA SCREENING: HCPCS

## 2021-09-01 PROCEDURE — 80050 GENERAL HEALTH PANEL: CPT

## 2021-09-01 PROCEDURE — 99214 OFFICE O/P EST MOD 30 MIN: CPT | Performed by: INTERNAL MEDICINE

## 2021-09-01 PROCEDURE — 83036 HEMOGLOBIN GLYCOSYLATED A1C: CPT

## 2021-09-01 RX ORDER — LORATADINE 10 MG/1
10 TABLET ORAL DAILY
Qty: 90 TABLET | Refills: 1 | Status: SHIPPED | OUTPATIENT
Start: 2021-09-01 | End: 2022-03-08 | Stop reason: SDUPTHER

## 2021-09-01 RX ORDER — MONTELUKAST SODIUM 10 MG/1
10 TABLET ORAL EVERY EVENING
Qty: 90 TABLET | Refills: 1 | Status: SHIPPED | OUTPATIENT
Start: 2021-09-01 | End: 2022-02-25

## 2021-09-01 RX ORDER — METHYLPREDNISOLONE 4 MG/1
TABLET ORAL
Qty: 21 TABLET | Refills: 0 | Status: SHIPPED | OUTPATIENT
Start: 2021-09-01 | End: 2021-10-22

## 2021-09-01 RX ORDER — POTASSIUM CHLORIDE 750 MG/1
10 TABLET, FILM COATED, EXTENDED RELEASE ORAL DAILY
Qty: 180 TABLET | Refills: 1 | Status: SHIPPED | OUTPATIENT
Start: 2021-09-01 | End: 2022-02-25

## 2021-09-01 NOTE — PROGRESS NOTES
Hypertension, Hyperlipidemia, and Elbow Pain (for 1 month from fall)    Bryn Joshua is a 46 y.o. male is here today for follow-up.    History of Present Illness   S/p colonoscopy. Fell off the table 1 monhth ago and landed on his rt hand, and now rt elbow is swollen and feels tender like a bone has chipped.  Did not do anything    Also Rt foot bunion very painful, unable to walk.        Current Outpatient Medications:   •  albuterol sulfate  (90 Base) MCG/ACT inhaler, INHALE 2 PUFFS BY MOUTH EVERY 4 HOURS IF NEEDED FOR WHEEZING, Disp: 18 g, Rfl: 3  •  atorvastatin (LIPITOR) 10 MG tablet, TAKE 1 TABLET BY MOUTH DAILY, Disp: 90 tablet, Rfl: 1  •  budesonide-formoterol (SYMBICORT) 160-4.5 MCG/ACT inhaler, INHALE 2 PUFFS BY MOUTH TWICE DAILY, Disp: 10.2 g, Rfl: 1  •  chlorthalidone (HYGROTON) 25 MG tablet, TAKE 1 TABLET BY MOUTH DAILY, Disp: 90 tablet, Rfl: 1  •  lisinopril (PRINIVIL,ZESTRIL) 10 MG tablet, TAKE 1 TABLET BY MOUTH DAILY, Disp: 90 tablet, Rfl: 1  •  loratadine (CLARITIN) 10 MG tablet, Take 1 tablet by mouth Daily., Disp: 90 tablet, Rfl: 1  •  montelukast (SINGULAIR) 10 MG tablet, Take 1 tablet by mouth Every Evening., Disp: 90 tablet, Rfl: 1  •  potassium chloride (KLOR-CON) 10 MEQ CR tablet, Take 1 tablet by mouth Daily., Disp: 180 tablet, Rfl: 1  •  Diclofenac Sodium (VOLTAREN) 1 % gel gel, Apply 4 g topically to the appropriate area as directed 3 (Three) Times a Day., Disp: 100 g, Rfl: 5  •  methylPREDNISolone (MEDROL) 4 MG dose pack, follow package directions, Disp: 21 tablet, Rfl: 0      The following portions of the patient's history were reviewed and updated as appropriate: allergies, current medications, past family history, past medical history, past social history, past surgical history and problem list.    Review of Systems   Constitutional: Negative.  Negative for chills and fever.   HENT: Negative for ear discharge, ear pain, sinus pressure and sore throat.    Respiratory:  "Negative for cough, chest tightness and shortness of breath.    Cardiovascular: Negative for chest pain, palpitations and leg swelling.   Gastrointestinal: Negative for diarrhea, nausea and vomiting.   Musculoskeletal: Negative for arthralgias, back pain and myalgias.   Neurological: Negative for dizziness, syncope and headaches.   Psychiatric/Behavioral: Negative for confusion and sleep disturbance.       Objective   /80   Pulse 74   Ht 177.8 cm (70\")   Wt 91 kg (200 lb 9.6 oz)   SpO2 96% Comment: ra  BMI 28.78 kg/m²   Physical Exam  Vitals and nursing note reviewed.   Constitutional:       Appearance: He is well-developed.   HENT:      Head: Normocephalic and atraumatic.      Right Ear: External ear normal.      Left Ear: External ear normal.      Mouth/Throat:      Pharynx: No oropharyngeal exudate.   Eyes:      Conjunctiva/sclera: Conjunctivae normal.      Pupils: Pupils are equal, round, and reactive to light.   Neck:      Thyroid: No thyromegaly.   Cardiovascular:      Rate and Rhythm: Normal rate and regular rhythm.      Pulses: Normal pulses.      Heart sounds: Normal heart sounds. No murmur heard.   No friction rub. No gallop.    Pulmonary:      Effort: Pulmonary effort is normal.      Breath sounds: Normal breath sounds.   Abdominal:      General: Bowel sounds are normal. There is no distension.      Palpations: Abdomen is soft.      Tenderness: There is no abdominal tenderness.   Musculoskeletal:      Cervical back: Neck supple.   Skin:     General: Skin is warm and dry.   Neurological:      Mental Status: He is alert and oriented to person, place, and time.      Cranial Nerves: No cranial nerve deficit.   Psychiatric:         Judgment: Judgment normal.           Results for orders placed or performed in visit on 04/09/21   Tissue Pathology Exam    Specimen: 1: Large Intestine, Transverse Colon; Tissue    2: Large Intestine, Rectum; Tissue   Result Value Ref Range    Case Report       Surgical " Pathology Report                         Case: [id number]                                  Authorizing Provider:  Manuel Ramires MD    Collected:           04/09/2021 10:33 AM          Ordering Location:     [facility]   Received:            04/12/2021 07:56 AM                                 LABORATORY                                                                   Pathologist:           Herminio Toscano MD                                                           Specimens:   1) - Large Intestine, Transverse Colon                                                              2) - Large Intestine, Rectum                                                               Clinical Information       Screening, polyps resected and retrieved, fair prep.      Final Diagnosis       1. TRANSVERSE COLON POLYP:  Fragments of tubular adenoma; no high grade dysplasia identified.   2. RECTUM POLYP:  Focal hyperplastic epithelial change suggestive of hyperplastic polyp; no atypia identified.   D/[facility] [place]       Gross Description       Specimen 1 is received in formalin labeled transverse colon polyp and consists of a 0.5 x 0.2 x 0.1 cm aggregate of multiple fragments of tan soft tissue and vegetative material submitted entirely in 1 cassette.    Specimen 2 is received in formalin labeled rectum polyp and consists of a 0.6 x 0.1 x 0.1 cm fragment of tan soft tissue submitted entirely in 1 cassette.  [facility]         Microscopic Description       Sections of the colonic fragments display tightly packed tubular crypt profiles in which the epithelium shows adenomatous change consisting of nuclear basophilic hyperchromasia and pseudostratification with mild dysplasia; high-grade epithelial dysplasia is absent.    Sections show fragments of colonic tissue in which the hyperplastic mucosal surface epithelium displays a sawtooth configuration; the subjacent crypts lined by the hyperplastic epithelium appear stellate when cut in  cross section. No dysplasia is seen.                Assessment/Plan   Diagnoses and all orders for this visit:    Olecranon bursitis of right elbow  -     XR elbow 2 vw right; Future  -     Diclofenac Sodium (VOLTAREN) 1 % gel gel; Apply 4 g topically to the appropriate area as directed 3 (Three) Times a Day.  -     methylPREDNISolone (MEDROL) 4 MG dose pack; follow package directions    Moderate persistent asthma without complication  -     loratadine (CLARITIN) 10 MG tablet; Take 1 tablet by mouth Daily.  -     montelukast (SINGULAIR) 10 MG tablet; Take 1 tablet by mouth Every Evening.    Hypokalemia  -     potassium chloride (KLOR-CON) 10 MEQ CR tablet; Take 1 tablet by mouth Daily.    Bunion of right foot  -     Diclofenac Sodium (VOLTAREN) 1 % gel gel; Apply 4 g topically to the appropriate area as directed 3 (Three) Times a Day.  -     methylPREDNISolone (MEDROL) 4 MG dose pack; follow package directions  -     Ambulatory Referral to Orthopedic Surgery    Closed fracture of right elbow, initial encounter  -     Ambulatory Referral to Orthopedic Surgery           Non fasting labs.( pending from his physical)      Return in about 6 months (around 3/1/2022) for Annual.    Electronically signed by:    Alexandrea Hanna MD

## 2021-09-15 ENCOUNTER — TELEPHONE (OUTPATIENT)
Dept: INTERNAL MEDICINE | Facility: CLINIC | Age: 46
End: 2021-09-15

## 2021-09-21 ENCOUNTER — OFFICE VISIT (OUTPATIENT)
Dept: ORTHOPEDIC SURGERY | Facility: CLINIC | Age: 46
End: 2021-09-21

## 2021-09-21 VITALS
DIASTOLIC BLOOD PRESSURE: 92 MMHG | WEIGHT: 200.62 LBS | BODY MASS INDEX: 28.72 KG/M2 | HEIGHT: 70 IN | SYSTOLIC BLOOD PRESSURE: 156 MMHG

## 2021-09-21 DIAGNOSIS — S46.311A TRICEPS STRAIN, RIGHT, INITIAL ENCOUNTER: Primary | ICD-10-CM

## 2021-09-21 DIAGNOSIS — M70.21 OLECRANON BURSITIS OF RIGHT ELBOW: ICD-10-CM

## 2021-09-21 DIAGNOSIS — M25.721 OSTEOPHYTE OF RIGHT ELBOW: ICD-10-CM

## 2021-09-21 PROCEDURE — 99204 OFFICE O/P NEW MOD 45 MIN: CPT | Performed by: ORTHOPAEDIC SURGERY

## 2021-09-21 NOTE — PROGRESS NOTES
[facility] [place] [facility] [place] - Reynaldo Loza MD    Office Visit       Patient Name: Tc Joshua    Chief Complaint:   Chief Complaint   Patient presents with   • Right Elbow - Pain     DOI: 08/02/21        Referring Physician: Alexandrea Hanna MD-I appreciate the referral    History of Present Illness:   Tc Joshua is a 46 y.o. male who presents with right body part: elbow Reason: pain.  Onset:Onset: atraumatic and gradual in nature. The issue has been ongoing for 1 month(s). Pain is a 6/10 on the pain scale. Pain is described as Pain Characterization: throbbing and shooting. Associated symptoms include Symptoms: swelling. The pain is worse with working. Previous treatments have included: NSAIDS.  I have reviewed the patient's history of present illness as noted/entered above.    I have reviewed the patient's past medical history, surgical history, social history, family history, medications, and allergies as noted in the electronic medical record and as noted/entered.  I have reviewed the patient's review of systems as noted/enter and updated as noted in the patient's HPI.    RIGHT ELBOW  Elbow pain, fall in his hand August 2, 2021.  Recent x-rays reviewed osteophyte noted no acute fracture noted.  Counseled on the radiographic and clinical findings.    Occupation:     [employer]    Pain with resisted extension/triceps extension; enjoys working out, not able to workout/do push-ups    Enjoys: outdoors - rock climbing, fishing, camping  3 daughters      Subjective   Subjective      Review of Systems   Constitutional: Negative.  Negative for chills, fatigue and fever.   HENT: Negative.  Negative for congestion and dental problem.    Eyes: Negative.  Negative for blurred vision.   Respiratory: Negative.  Negative for shortness of breath.    Cardiovascular: Negative.  Negative for leg swelling.    Gastrointestinal: Negative.  Negative for abdominal pain.   Endocrine: Negative.  Negative for polyuria.   Genitourinary: Negative.  Negative for difficulty urinating.   Musculoskeletal: Positive for arthralgias.   Skin: Negative.    Allergic/Immunologic: Negative.    Neurological: Negative.    Hematological: Negative.  Negative for adenopathy.   Psychiatric/Behavioral: Negative.  Negative for behavioral problems.        Past Medical History:   Past Medical History:   Diagnosis Date   • Allergic rhinitis    • Asthma        Past Surgical History:   Past Surgical History:   Procedure Laterality Date   • NO PAST SURGERIES         Family History:   Family History   Problem Relation Age of Onset   • No Known Problems Mother    • No Known Problems Father        Social History:   Social History     Socioeconomic History   • Marital status:      Spouse name: Not on file   • Number of children: Not on file   • Years of education: Not on file   • Highest education level: Not on file   Tobacco Use   • Smoking status: Former Smoker     Types: Cigarettes   • Smokeless tobacco: Never Used   Substance and Sexual Activity   • Alcohol use: Yes     Alcohol/week: 3.0 standard drinks     Types: 3 Cans of beer per week   • Drug use: No   • Sexual activity: Defer       Medications:   Current Outpatient Medications:   •  albuterol sulfate  (90 Base) MCG/ACT inhaler, INHALE 2 PUFFS BY MOUTH EVERY 4 HOURS IF NEEDED FOR WHEEZING, Disp: 18 g, Rfl: 3  •  atorvastatin (LIPITOR) 10 MG tablet, TAKE 1 TABLET BY MOUTH DAILY, Disp: 90 tablet, Rfl: 1  •  budesonide-formoterol (SYMBICORT) 160-4.5 MCG/ACT inhaler, INHALE 2 PUFFS BY MOUTH TWICE DAILY, Disp: 10.2 g, Rfl: 1  •  chlorthalidone (HYGROTON) 25 MG tablet, TAKE 1 TABLET BY MOUTH DAILY, Disp: 90 tablet, Rfl: 1  •  Diclofenac Sodium (VOLTAREN) 1 % gel gel, Apply 4 g topically to the appropriate area as directed 3 (Three) Times a Day., Disp: 100 g, Rfl: 5  •  lisinopril  "(PRINIVIL,ZESTRIL) 10 MG tablet, TAKE 1 TABLET BY MOUTH DAILY, Disp: 90 tablet, Rfl: 1  •  loratadine (CLARITIN) 10 MG tablet, Take 1 tablet by mouth Daily., Disp: 90 tablet, Rfl: 1  •  methylPREDNISolone (MEDROL) 4 MG dose pack, follow package directions, Disp: 21 tablet, Rfl: 0  •  montelukast (SINGULAIR) 10 MG tablet, Take 1 tablet by mouth Every Evening., Disp: 90 tablet, Rfl: 1  •  potassium chloride (KLOR-CON) 10 MEQ CR tablet, Take 1 tablet by mouth Daily., Disp: 180 tablet, Rfl: 1    Allergies: No Known Allergies    The following portions of the patient's history were reviewed and updated as appropriate: allergies, current medications, past family history, past medical history, past social history, past surgical history and problem list.        Objective    Objective      Vital Signs:   Vitals:    09/21/21 0824   BP: 156/92   Weight: 91 kg (200 lb 9.9 oz)   Height: 177.8 cm (70\")       Ortho Exam:  Right elbow excellent strength with triceps extension but does have pain at the triceps insertion olecranon traction osteophyte.  Small amount of bursitis no evidence of septic bursitis  Soreness over the olecranon osteophyte  No skin changes  Excellent strength and full range of motion      Results Review:   Imaging Results (Last 24 Hours)     ** No results found for the last 24 hours. **        XR Elbow 2 View Right    Result Date: 9/1/2021  There is an olecranon spurring with fragmentation. Some soft tissue swelling posteriorly in which a small olecranon bursitis cannot be excluded.  D:  09/01/2021 E:  09/01/2021  This report was finalized on 9/1/2021 5:04 PM by Dr. Radha Barrett MD.        Reviewed the images above and interpreted.  Well-corticated no evidence of acute fracture noted perhaps chronic traction osteophyte changes and olecranon bursitis    Procedures             Assessment / Plan      Assessment/Plan:   Problem List Items Addressed This Visit        Musculoskeletal and Injuries    Olecranon " bursitis of right elbow    Relevant Medications    Diclofenac Sodium (VOLTAREN) 1 % gel gel    methylPREDNISolone (MEDROL) 4 MG dose pack    Other Relevant Orders    MRI Elbow Right Without Contrast    Osteophyte of right elbow    Relevant Orders    MRI Elbow Right Without Contrast       Other    Triceps strain, right, initial encounter - Primary    Relevant Orders    MRI Elbow Right Without Contrast          RIGHT ELBOW MRI -- possible triceps partial tearing  Right elbow acute changes with olecranon bursitis, traction osteophyte with possible fracturing at some point.  Does appear to be more chronic at this point.  He does have potential involvement of his triceps insertion so MRI of the right elbow recommended given persistent pain with resisted triceps extension and large a traction osteophyte.    Follow Up: AFTER RIGHT ELBOW MRI        Reynaldo Loza MD, FAAOS  Orthopedic Surgeon  Fellowship Trained Shoulder and Elbow Surgeon  [facility]  Orthopedics and Sports Medicine  [address] [place] [address], [address]  [place] [address]    09/21/21  09:05 EDT    Please note that portions of this note may have been completed with a voice recognition program. Efforts were made to edit the dictations, but occasionally words are mistranscribed.

## 2021-10-17 ENCOUNTER — HOSPITAL ENCOUNTER (OUTPATIENT)
Dept: MRI IMAGING | Facility: HOSPITAL | Age: 46
Discharge: HOME | End: 2021-10-17
Admitting: ORTHOPAEDIC SURGERY

## 2021-10-17 DIAGNOSIS — S46.311A TRICEPS STRAIN, RIGHT, INITIAL ENCOUNTER: ICD-10-CM

## 2021-10-17 DIAGNOSIS — M70.21 OLECRANON BURSITIS OF RIGHT ELBOW: ICD-10-CM

## 2021-10-17 DIAGNOSIS — M25.721 OSTEOPHYTE OF RIGHT ELBOW: ICD-10-CM

## 2021-10-17 PROCEDURE — 73221 MRI JOINT UPR EXTREM W/O DYE: CPT

## 2021-10-19 ENCOUNTER — OFFICE VISIT (OUTPATIENT)
Dept: ORTHOPEDIC SURGERY | Facility: CLINIC | Age: 46
End: 2021-10-19

## 2021-10-19 VITALS
BODY MASS INDEX: 28.72 KG/M2 | WEIGHT: 200.62 LBS | DIASTOLIC BLOOD PRESSURE: 109 MMHG | SYSTOLIC BLOOD PRESSURE: 168 MMHG | HEIGHT: 70 IN | HEART RATE: 98 BPM

## 2021-10-19 DIAGNOSIS — S46.311A TRICEPS STRAIN, RIGHT, INITIAL ENCOUNTER: ICD-10-CM

## 2021-10-19 DIAGNOSIS — M70.21 OLECRANON BURSITIS OF RIGHT ELBOW: ICD-10-CM

## 2021-10-19 DIAGNOSIS — S46.311D RUPTURE OF RIGHT TRICEPS TENDON, SUBSEQUENT ENCOUNTER: Primary | ICD-10-CM

## 2021-10-19 DIAGNOSIS — M25.721 OSTEOPHYTE OF RIGHT ELBOW: ICD-10-CM

## 2021-10-19 PROCEDURE — 99214 OFFICE O/P EST MOD 30 MIN: CPT | Performed by: ORTHOPAEDIC SURGERY

## 2021-10-19 NOTE — PROGRESS NOTES
"                                                                [facility] [place] [facility] Office Follow Up       Office Follow Up Visit       Patient Name: Tc Joshua    Chief Complaint:   Chief Complaint   Patient presents with   • Right Elbow - Follow-up     After MRI 10/17/2021       Referring Physician: No ref. provider found    History of Present Illness:   It has been 4  week(s) since Tc Joshua's last visit. Tc Joshua returns to clinic today for F/U: follow-up of rightBody Part: elbowReason: pain. The issue has been ongoing for 2 month(s). Tc Joshua rates HIS/HER: hispain at 4/10 on the pain scale. Previous/current treatments: NSAIDS. Current symptoms:Symptoms: pain and swelling. The pain is worse with sleeping and working; nothing improves the pain. Overall, he/she: heis doing the same.  I have reviewed the patient's history of present illness as noted/entered above.    I have reviewed the patient's past medical history, surgical history, social history, family history, medications, and allergies as noted in the electronic medical record and as noted/entered.  I have reviewed the patient's review of systems as noted/enter and updated as noted in the patient's HPI.    RIGHT ELBOW  Elbow pain, fall in his hand August 2, 2021.  Recent x-rays reviewed osteophyte noted no acute fracture noted.  Counseled on the radiographic and clinical findings.     Occupation:      [employer]     Pain with resisted extension/triceps extension; enjoys working out, not able to workout/do push-ups     Enjoys: outdoors - rock climbing, fishing, camping  3 daughters     10/19/2021:  Right elbow MRI completed  \"I can't exercise like I want to\"   Tried to work out after the last visit  Wakes up at night  Large olecranon bursitis  Felt a pop with initial injury        Subjective   Subjective      Review of Systems     Past Medical History:   Past Medical History:   Diagnosis Date   • Allergic " rhinitis    • Asthma        Past Surgical History:   Past Surgical History:   Procedure Laterality Date   • NO PAST SURGERIES         Family History:   Family History   Problem Relation Age of Onset   • No Known Problems Mother    • No Known Problems Father        Social History:   Social History     Socioeconomic History   • Marital status:    Tobacco Use   • Smoking status: Former Smoker     Types: Cigarettes   • Smokeless tobacco: Never Used   Substance and Sexual Activity   • Alcohol use: Yes     Alcohol/week: 3.0 standard drinks     Types: 3 Cans of beer per week   • Drug use: No   • Sexual activity: Defer       Medications:   Current Outpatient Medications:   •  albuterol sulfate  (90 Base) MCG/ACT inhaler, INHALE 2 PUFFS BY MOUTH EVERY 4 HOURS IF NEEDED FOR WHEEZING, Disp: 18 g, Rfl: 3  •  atorvastatin (LIPITOR) 10 MG tablet, TAKE 1 TABLET BY MOUTH DAILY, Disp: 90 tablet, Rfl: 1  •  budesonide-formoterol (SYMBICORT) 160-4.5 MCG/ACT inhaler, INHALE 2 PUFFS BY MOUTH TWICE DAILY, Disp: 10.2 g, Rfl: 1  •  chlorthalidone (HYGROTON) 25 MG tablet, TAKE 1 TABLET BY MOUTH DAILY, Disp: 90 tablet, Rfl: 1  •  Diclofenac Sodium (VOLTAREN) 1 % gel gel, Apply 4 g topically to the appropriate area as directed 3 (Three) Times a Day., Disp: 100 g, Rfl: 5  •  lisinopril (PRINIVIL,ZESTRIL) 10 MG tablet, TAKE 1 TABLET BY MOUTH DAILY, Disp: 90 tablet, Rfl: 1  •  loratadine (CLARITIN) 10 MG tablet, Take 1 tablet by mouth Daily., Disp: 90 tablet, Rfl: 1  •  methylPREDNISolone (MEDROL) 4 MG dose pack, follow package directions, Disp: 21 tablet, Rfl: 0  •  montelukast (SINGULAIR) 10 MG tablet, Take 1 tablet by mouth Every Evening., Disp: 90 tablet, Rfl: 1  •  potassium chloride (KLOR-CON) 10 MEQ CR tablet, Take 1 tablet by mouth Daily., Disp: 180 tablet, Rfl: 1    Allergies: No Known Allergies    The following portions of the patient's history were reviewed and updated as appropriate: allergies, current medications, past  "family history, past medical history, past social history, past surgical history and problem list.        Objective    Objective      Vital Signs:   Vitals:    10/19/21 0926   BP: (!) 168/109   Pulse: 98   Weight: 91 kg (200 lb 9.9 oz)   Height: 177.8 cm (70\")       Ortho Exam:  RIGHT ELBOW  Continued insertional pain at the distal triceps pain with resisted extension, well-built, large olecranon bursal tissue no evidence of infection.    Results Review:  Imaging Results (Last 24 Hours)     ** No results found for the last 24 hours. **          XR Elbow 2 View Right    Result Date: 9/1/2021  There is an olecranon spurring with fragmentation. Some soft tissue swelling posteriorly in which a small olecranon bursitis cannot be excluded.  D:  09/01/2021 E:  09/01/2021  This report was finalized on 9/1/2021 5:04 PM by Dr. Radha Barrett MD.      MRI Elbow Right Without Contrast    Result Date: 10/18/2021   Distal triceps tendinosis with low-grade interstitial tearing.  Olecranon bursitis.  This report was finalized on 10/18/2021 8:22 AM by Migel Abdi MD.        I personally interpreted the above MRI of the right elbow.  Significant olecranon bursitis is noted into me at least a portion of the triceps does have full-thickness extension intrasubstance tearing that extends has a ribbonlike appearance and detachment from the olecranon distally.  Clinically this does match he does have reasonable strength but pain with resisted triceps extension and pain to palpation over the triceps insertion along with olecranon bursitis    Standard appearance of the olecranon bursal fluid consistent with olecranon bursitis    Procedures            Assessment / Plan      Assessment/Plan:   Problem List Items Addressed This Visit        Musculoskeletal and Injuries    Olecranon bursitis of right elbow    Relevant Medications    Diclofenac Sodium (VOLTAREN) 1 % gel gel    methylPREDNISolone (MEDROL) 4 MG dose pack    Other Relevant " Orders    COVID PRE-OP / PRE-PROCEDURE SCREENING ORDER (NO ISOLATION) - Swab, Nasopharynx    External Facility Surgical/Procedural Request    Osteophyte of right elbow    Relevant Orders    COVID PRE-OP / PRE-PROCEDURE SCREENING ORDER (NO ISOLATION) - Swab, Nasopharynx    External Facility Surgical/Procedural Request    Rupture of right triceps tendon - Primary    Relevant Orders    COVID PRE-OP / PRE-PROCEDURE SCREENING ORDER (NO ISOLATION) - Swab, Nasopharynx    External Facility Surgical/Procedural Request       Other    Triceps strain, right, initial encounter    Relevant Orders    COVID PRE-OP / PRE-PROCEDURE SCREENING ORDER (NO ISOLATION) - Swab, Nasopharynx    External Facility Surgical/Procedural Request          Right elbow triceps partial tearing perhaps with full-thickness extension olecranon bursitis.  Patient is in favor proceeding with surgery counseled risk and benefits including continued pain and weakness with triceps, continued or recurrence of olecranon bursitis, healing issues, the additional time needed off work in several months to recover from this type of injury.    His pain and pop corresponds with tearing of the triceps.  Manual labor, heavy duty enjoys lifting as well will take some time to return to this counseled on the risks and benefits at length.      RIGHT ELBOW  1. Distal triceps repair CPT code 40482  2. olecranon bursal excision CPT code 29572    Block  [facility], no PAT, healthy gentleman      Follow Up: Patient desires to proceed with surgery.  He is targeting approximately 30 days from now when his major work season is over      Reynaldo Loza MD, FAAOS  Orthopedic Surgeon  Fellowship Trained Shoulder and Elbow Surgeon  [facility]  Orthopedics and Sports Medicine  [address], [address]  [place] [address]    10/19/21  10:09 EDT    Please note that portions of this note may have been completed with a voice recognition program. Efforts were made to edit  the dictations, but occasionally words are mistranscribed.

## 2021-10-21 ENCOUNTER — TELEPHONE (OUTPATIENT)
Dept: INTERNAL MEDICINE | Facility: CLINIC | Age: 46
End: 2021-10-21

## 2021-10-21 NOTE — TELEPHONE ENCOUNTER
PATIENT HURT HIS ELBOW IN September AND NEEDED TO INFORM PCP THAT IT WAS A WORKER'S COMP ISSUE SO INFORMATION CAN BE RELEASED TO [employer] OR WHOMEVER IS HANDLING HIS CLAIM. PLEASE RETURN CALL TO PATIENT WITH QUESTIONS [phone]

## 2021-10-22 ENCOUNTER — OFFICE VISIT (OUTPATIENT)
Dept: ORTHOPEDIC SURGERY | Facility: CLINIC | Age: 46
End: 2021-10-22

## 2021-10-22 VITALS
DIASTOLIC BLOOD PRESSURE: 106 MMHG | BODY MASS INDEX: 28.72 KG/M2 | HEIGHT: 70 IN | HEART RATE: 69 BPM | WEIGHT: 200.62 LBS | SYSTOLIC BLOOD PRESSURE: 152 MMHG

## 2021-10-22 DIAGNOSIS — M24.574 CONTRACTURE OF JOINT OF RIGHT FOOT: ICD-10-CM

## 2021-10-22 DIAGNOSIS — M79.671 RIGHT FOOT PAIN: Primary | ICD-10-CM

## 2021-10-22 PROCEDURE — 99213 OFFICE O/P EST LOW 20 MIN: CPT | Performed by: PHYSICIAN ASSISTANT

## 2021-10-22 NOTE — PROGRESS NOTES
[facility]        Subjective     Pain of the Right Foot      HPI    Tc Joshua is a 46 y.o. male.  This is a very pleasant patient of Dr. Jones is presenting with a new problem.  Here to discuss his right bunion and second toe pain.  He reports is been progressively worsening over the years.  He has pain with pressure from the shoe with bunion that is 8/10 over the PIP joint of the second toe.  He also has pain on the right side of her head.  He describes this as sharp.  He has worn a pressure.  No prior treatment other than that.    Past Medical History:   Diagnosis Date   • Allergic rhinitis    • Asthma       Past Surgical History:   Procedure Laterality Date   • NO PAST SURGERIES        Family History   Problem Relation Age of Onset   • No Known Problems Mother    • No Known Problems Father      Social History     Socioeconomic History   • Marital status:    Tobacco Use   • Smoking status: Former Smoker     Types: Cigarettes   • Smokeless tobacco: Never Used   Substance and Sexual Activity   • Alcohol use: Yes     Alcohol/week: 3.0 standard drinks     Types: 3 Cans of beer per week   • Drug use: No   • Sexual activity: Defer      Current Outpatient Medications on File Prior to Visit   Medication Sig Dispense Refill   • albuterol sulfate  (90 Base) MCG/ACT inhaler INHALE 2 PUFFS BY MOUTH EVERY 4 HOURS IF NEEDED FOR WHEEZING 18 g 3   • atorvastatin (LIPITOR) 10 MG tablet TAKE 1 TABLET BY MOUTH DAILY 90 tablet 1   • budesonide-formoterol (SYMBICORT) 160-4.5 MCG/ACT inhaler INHALE 2 PUFFS BY MOUTH TWICE DAILY 10.2 g 1   • chlorthalidone (HYGROTON) 25 MG tablet TAKE 1 TABLET BY MOUTH DAILY 90 tablet 1   • Diclofenac Sodium (VOLTAREN) 1 % gel gel Apply 4 g topically to the appropriate area as directed 3 (Three) Times a Day. 100 g 5   • lisinopril (PRINIVIL,ZESTRIL) 10 MG tablet TAKE 1 TABLET BY MOUTH DAILY 90 tablet 1   • loratadine (CLARITIN) 10 MG tablet Take 1 tablet  "by mouth Daily. 90 tablet 1   • montelukast (SINGULAIR) 10 MG tablet Take 1 tablet by mouth Every Evening. 90 tablet 1   • potassium chloride (KLOR-CON) 10 MEQ CR tablet Take 1 tablet by mouth Daily. 180 tablet 1     No current facility-administered medications on file prior to visit.      No Known Allergies       Review of Systems   Constitutional: Negative.    HENT: Negative.    Eyes: Negative.    Respiratory: Negative.    Cardiovascular: Negative.    Gastrointestinal: Negative.    Endocrine: Negative.    Genitourinary: Negative.    Musculoskeletal: Positive for arthralgias.   Skin: Negative.    Allergic/Immunologic: Negative.    Neurological: Negative.    Hematological: Negative.    Psychiatric/Behavioral: Negative.         I reviewed the patient's chief complaint, history of present illness, review of systems, past medical history, surgical history, family history, social history, medications and allergy list.        Objective      Physical Exam  BP (!) 152/106   Pulse 69   Ht 177.8 cm (70\")   Wt 91 kg (200 lb 9.9 oz)   BMI 28.79 kg/m²     Body mass index is 28.79 kg/m².    General  Mental Status - alert  General Appearance - cooperative, well groomed, not in acute distress  Orientation - Oriented X3  Build & Nutrition - well developed and well nourished  Posture - normal posture  Gait - normal       Ortho Exam  V:  Dorsalis Pedis:  Right: 2+;     Posterior Tibial: Right:2+;     Capillary Refill:  Brisk  MSK:      Tibia:  Right:  non tender;    Ankle:  Right: non tender, ROM  normal and motor function  normal;     Foot:  Right:  Mildly tender over the bunion.  Second hammertoe with tenderness under the second metatarsal head;       NEURO:     [place] 5.07 monofilament test: not evaluated    Lower extremity sensation: intact     Calf Atrophy:none    Motor Function: all 5/5            Imaging/Studies  Imaging Results (Last 24 Hours)     Procedure Component Value Units Date/Time    XR Foot 2 View Right " [[id number]] Resulted: 10/22/21 0931     Updated: 10/22/21 0932    Narrative:      Standing AP lateral right foot ordered for pain, shows moderate hallux   valgus metatarsus primus varus and bunion, mild second hammertoe, no   fractures, no comparison            Assessment    Assessment:  1. Right foot pain    2. Contracture of joint of right foot          Plan:  1. Recommend over-the-counter medication as needed for discomfort  2.  Right hallux valgus metatarsus primus varus with second hammertoe.  I reviewed the clinical findings past and current treatment the patient. Hallux valgus, metatarsus primus varus and painful bunion.  I explained the mechanical nature of the problem.  I explained that the bunion is not a growth on the foot but rather it is a widening of the angle between the first and second metatarsals.  I drew a picture on the foot and explained the problem in detail.  I explained that bunions are a result of heredity and shoe wear.  The reason they hurt is from shoes pushing on the bunion.      I explained that first line treatment is conservative.  Wide, round toed shoes  can help them be comfortable.  Sometimes custom orthotics can help if they also have metatarsalgia.  I explained that there is no brace that works to change the bunion angle.      If conservative treatment does not help the pain enough then the patient might consider surgery. Pain is the only indication for surgery.       Patient is not ready to consider surgery at this time.  He has been having metatarsalgia as well as pain at the PIP joint.  I have given him a 1 week bunion splints, showed him taping, and giving him a prescription for custom orthotics with metatarsal posting.  Return to see us as needed.    Patient history, diagnosis and treatment plan discussed with Dr. Shabazz.       Enid Dockery PA-C  10/23/21  08:51 EDT

## 2021-10-26 ENCOUNTER — TELEPHONE (OUTPATIENT)
Dept: ORTHOPEDIC SURGERY | Facility: CLINIC | Age: 46
End: 2021-10-26

## 2021-10-26 DIAGNOSIS — J45.40 MODERATE PERSISTENT ASTHMA WITHOUT COMPLICATION: ICD-10-CM

## 2021-10-26 RX ORDER — BUDESONIDE AND FORMOTEROL FUMARATE DIHYDRATE 160; 4.5 UG/1; UG/1
AEROSOL RESPIRATORY (INHALATION)
Qty: 10.2 G | Refills: 5 | Status: SHIPPED | OUTPATIENT
Start: 2021-10-26 | End: 2022-03-08 | Stop reason: SDUPTHER

## 2021-10-26 NOTE — TELEPHONE ENCOUNTER
Caller: YASAMNY HOWARD    Relationship: [employer]    Best call back number:     What form or medical record are you requesting: PROGRESS NOTES 9/21/21, 10/19/21 AND 10/22/21    Who is requesting this form or medical record from you: [employer]    How would you like to receive the form or medical records (pick-up, mail, fax): FAX  If fax, what is the fax number:   [phone]      Timeframe paperwork needed: ASAP    Additional notes: [employer] NEEDS THESE PROGRESS NOTES TO BE ABLE TO AUTHORIZE THE PATIENT'S UPCOMING SURGERY

## 2021-11-12 ENCOUNTER — LAB (OUTPATIENT)
Dept: PREADMISSION TESTING | Facility: HOSPITAL | Age: 46
End: 2021-11-12

## 2021-11-12 DIAGNOSIS — S46.311D RUPTURE OF RIGHT TRICEPS TENDON, SUBSEQUENT ENCOUNTER: ICD-10-CM

## 2021-11-12 DIAGNOSIS — S46.311D RUPTURE OF RIGHT TRICEPS TENDON, SUBSEQUENT ENCOUNTER: Primary | ICD-10-CM

## 2021-11-12 DIAGNOSIS — S46.311A TRICEPS STRAIN, RIGHT, INITIAL ENCOUNTER: ICD-10-CM

## 2021-11-12 DIAGNOSIS — M25.721 OSTEOPHYTE OF RIGHT ELBOW: ICD-10-CM

## 2021-11-12 DIAGNOSIS — M70.21 OLECRANON BURSITIS OF RIGHT ELBOW: ICD-10-CM

## 2021-11-12 LAB — SARS-COV-2 RNA PNL SPEC NAA+PROBE: NOT DETECTED

## 2021-11-12 PROCEDURE — U0004 COV-19 TEST NON-CDC HGH THRU: HCPCS | Performed by: ORTHOPAEDIC SURGERY

## 2021-11-12 RX ORDER — HYDROCODONE BITARTRATE AND ACETAMINOPHEN 10; 325 MG/1; MG/1
1 TABLET ORAL EVERY 4 HOURS PRN
Qty: 42 TABLET | Refills: 0 | Status: SHIPPED | OUTPATIENT
Start: 2021-11-12 | End: 2021-11-19

## 2021-11-12 RX ORDER — METHOCARBAMOL 500 MG/1
500 TABLET, FILM COATED ORAL 3 TIMES DAILY PRN
Qty: 40 TABLET | Refills: 1 | Status: SHIPPED | OUTPATIENT
Start: 2021-11-12 | End: 2021-11-26

## 2021-11-12 RX ORDER — ONDANSETRON 4 MG/1
4 TABLET, FILM COATED ORAL EVERY 6 HOURS PRN
Qty: 30 TABLET | Refills: 1 | Status: SHIPPED | OUTPATIENT
Start: 2021-11-12 | End: 2021-11-20

## 2021-11-15 ENCOUNTER — OUTSIDE FACILITY SERVICE (OUTPATIENT)
Dept: ORTHOPEDIC SURGERY | Facility: CLINIC | Age: 46
End: 2021-11-15

## 2021-11-15 ENCOUNTER — DOCUMENTATION (OUTPATIENT)
Dept: ORTHOPEDIC SURGERY | Facility: CLINIC | Age: 46
End: 2021-11-15

## 2021-11-15 PROCEDURE — 24341 RPR TDN/MUSC UPR A/E EACH: CPT | Performed by: ORTHOPAEDIC SURGERY

## 2021-11-15 PROCEDURE — 24105 EXCISION OLECRANON BURSA: CPT | Performed by: ORTHOPAEDIC SURGERY

## 2021-11-15 NOTE — PROGRESS NOTES
[facility]  [address] [place] [address]    [facility] OPERATIVE REPORT    Operative Report     Side: RIGHT ELBOW    LOCATION: [facility] OR     Surgeon: Reynaldo Loza MD       Preoperative Diagnosis:  1.       Right partial tear triceps tendon intrasubstance longitudinal split  2.  Right elbow olecranon bursitis  3.  Right olecranon traction osteophyte   Postoperative Diagnosis:  1.     SAME as preoperative diagnoses     Procedure:  1.       Right triceps repair CPT 66568  2.  Right olecranon bursectomy 13516 olecranon bursal excision CPT code 63894        Admission status: elective surgery     COVID-19 Statement: The patient understands that the surgery is elective surgery.  I discussed with the patient the risks and benefits of proceeding with surgery vs delaying surgery during the COVID-19 pandemic. The risks we discussed included but were not limited to the risk of rick a severe COVID-19 infection due to exposure within the hospital or during convalescence at home that may result in permanent injury or death.  The patient voiced understanding and gave their consent to proceed.  The patient desires to proceed.      Complications: None time     EBL: minimal    49 minutes tourniquet      Specimen: Bursectomy was performed the bursal tissue was benign-appearing and not sent for pathology or cultures no infected appearing material.  Along with the bursectomy removal of the triceps osteophyte was performed this was the area of defect and tearing of the triceps was already noted     Anesthesia: general anesthesia     Block: No block     Antibiotics: weight based IV antibiotics infused prior to incision     Time out: Time out was called and the patient, side, site, and intended procedures were confirmed.     Counts: Needle and sponge counts were correct prior to closure.     DVT prophylaxis: The patient will be weight bearing as tolerated on bilateral lower  extremities postoperatively with no additional chemical DVT prophylaxis indicated.     Marked: The patient was marked with an indelible marker in the preoperative holding area confirming the correct side and site.     History & Physical:   A history and physical examination was completed and updated in the preoperative holding area.     Consent: The patient signed the consent form for surgery with an understanding of the risks and benefits as outlined in the clinic and in the preoperative holding area.     Risks and Benefits: Specific risks and benefits discussed included - pain, bleeding, infection, injury to nerves or blood vessels, fracture, stiffness, failure to heal, revision surgery, complications of the block when administered, anesthetic complications, and medical complications associated with surgery.  COVID-19 awareness as noted and discussed.    Implants: #2 FiberWire for triceps repair    Procedure in Detail:  The patient struggled following an injury to his right triceps he likes to work out very active with his job and unable to perform those things.  He had an x-ray that confirmed a traction osteophyte we are concerned about a possible fracture of this given the significant pain he had but he also had what appeared to be partial tearing of the triceps as well.  Additionally had a large overlying olecranon bursitis.    Ultimately he decided to proceed with surgery.  Seen identified marked transition to the OR history and physical was updated.  Patient been marked consented understood the risk and benefits and desired to proceed.  Placed in the supine position general anesthetic local injection at the end no block.  Cleaned with alcohol sterilely prepped and draped utilizing a nonsterile tourniquet.  Made a standard posterior approach starting proximally at the presumed area of the osteophyte and tearing of the triceps and extended distally along the midline just past the area of the olecranon bursitis.   He already had a defect in the olecranon bursal tissue and this is the same area that probed all the way down to the triceps which showed a longitudinal split tear.  He also had a fairly sizable traction osteophyte in the same area.  This is presumed to be the area of primary pathology leading to the bursitis as well.  Was careful to protect the ulnar nerve throughout did not have to formally expose stayed directly in the midline.  Extensive bursectomy was performed olecranon bursectomy to try to take out as much of the bursa as we could en bloc.  Proceeded to take additional pieces of the bursal tissue out used a rongeur on the bone and cleaned everything up.  At this point remove the fairly sizable traction osteophyte that was in the area of triceps tearing as well.  It was evident that this was a partial longitudinal split tear of the triceps this was cleaned up and opened up to get to healthy tissue.  Did not see a formal indication for takedown of the intact triceps tendon and instead performed a side to side repair with 0 Vicryl in interrupted #2 FiberWire sutures.  And then did a running locking repair of the triceps as well with the #2 FiberWire.  Copiously irrigated extensive bursectomy had been performed removal of the osteophyte and triceps repair.  Copiously irrigated some more at this point and proceeded to close the deep layer with 3-0 Vicryl 4-0 Monocryl exception for the skin Steri-Strips and local injection was placed tourniquet was let down and maintained hemostasis and well-appearing placed a posterior slab splint with an A-frame with slight extension.      Electronically signed by Renyaldo Loza MD, 11/15/21, 2:52 PM EST.

## 2021-11-17 ENCOUNTER — TELEPHONE (OUTPATIENT)
Dept: ORTHOPEDIC SURGERY | Facility: CLINIC | Age: 46
End: 2021-11-17

## 2021-11-17 NOTE — TELEPHONE ENCOUNTER
Spoke with the patient. He informed me that this was a workers comp claim. I explained that Dr. Loza and myself were unaware of this until I received a letter authorizing his surgery today. The approval has been scanned into the chart.

## 2021-11-19 DIAGNOSIS — J45.40 MODERATE PERSISTENT ASTHMA WITHOUT COMPLICATION: ICD-10-CM

## 2021-11-19 RX ORDER — ALBUTEROL SULFATE 90 UG/1
AEROSOL, METERED RESPIRATORY (INHALATION)
Qty: 18 G | Refills: 3 | OUTPATIENT
Start: 2021-11-19

## 2021-12-02 ENCOUNTER — OFFICE VISIT (OUTPATIENT)
Dept: ORTHOPEDIC SURGERY | Facility: CLINIC | Age: 46
End: 2021-12-02

## 2021-12-02 VITALS — TEMPERATURE: 97.1 F

## 2021-12-02 DIAGNOSIS — S46.311A TRICEPS STRAIN, RIGHT, INITIAL ENCOUNTER: ICD-10-CM

## 2021-12-02 DIAGNOSIS — M70.21 OLECRANON BURSITIS OF RIGHT ELBOW: ICD-10-CM

## 2021-12-02 DIAGNOSIS — S46.311D RUPTURE OF RIGHT TRICEPS TENDON, SUBSEQUENT ENCOUNTER: ICD-10-CM

## 2021-12-02 DIAGNOSIS — M25.721 OSTEOPHYTE OF RIGHT ELBOW: ICD-10-CM

## 2021-12-02 DIAGNOSIS — Z09 SURGERY FOLLOW-UP: Primary | ICD-10-CM

## 2021-12-02 PROCEDURE — 99024 POSTOP FOLLOW-UP VISIT: CPT | Performed by: ORTHOPAEDIC SURGERY

## 2021-12-02 RX ORDER — METHOCARBAMOL 500 MG/1
500 TABLET, FILM COATED ORAL 4 TIMES DAILY
COMMUNITY
End: 2022-03-08

## 2021-12-02 NOTE — PROGRESS NOTES
[facility] [place] [facility] Office Follow Up       Office Follow Up Visit       Patient Name: Tc Joshua    Chief Complaint:   Chief Complaint   Patient presents with   • Post-op     2 weeks status post Right triceps repair and right olecranon bursectomy and olceranon bursal excision 11-15-21       Referring Physician: No ref. provider found    History of Present Illness:   It has been 2  week(s) since Tc Joshua's last visit. Tc Joshua returns to clinic today for F/U: postoperative follow-up of rightBody Part: elbowReason: Right triceps repair and right olecranon bursectomy and olceranon bursal excision. The issue has been ongoing for 1 month(s). Tc Joshua rates HIS/HER: hispain at 1/10 on the pain scale. Previous/current treatments: bracing and nothing. Current symptoms:Symptoms: pain. The pain is worse with sleeping and and some movement; resting improves the pain. Overall, he/she: heis doing better.  I have reviewed the patient's history of present illness as noted/entered above.    I have reviewed the patient's past medical history, surgical history, social history, family history, medications, and allergies as noted in the electronic medical record and as noted/entered.  I have reviewed the patient's review of systems as noted/enter and updated as noted in the patient's HPI.    Right elbow -bursectomy, osteophyte excision, triceps repair side to side 11/15/2021    12/2/2021: Right elbow is doing much better he can tell a big difference well-appearing today x-rays confirm removal of bursa and osteophyte    The osteophyte had extruded through the triceps tendon necessitating a side-to-side repair      Subjective   Subjective      Review of Systems   Constitutional: Negative.  Negative for chills, fatigue and fever.   HENT: Negative.  Negative for congestion and dental problem.    Eyes: Negative.  Negative for blurred vision.   Respiratory:  Negative.  Negative for shortness of breath.    Cardiovascular: Negative.  Negative for leg swelling.   Gastrointestinal: Negative.  Negative for abdominal pain.   Endocrine: Negative.  Negative for polyuria.   Genitourinary: Negative.  Negative for difficulty urinating.   Musculoskeletal: Positive for arthralgias.   Skin: Negative.    Allergic/Immunologic: Negative.    Neurological: Negative.    Hematological: Negative.  Negative for adenopathy.   Psychiatric/Behavioral: Negative.  Negative for behavioral problems.        Past Medical History:   Past Medical History:   Diagnosis Date   • Allergic rhinitis    • Asthma        Past Surgical History:   Past Surgical History:   Procedure Laterality Date   • ELBOW PROCEDURE Right 11/15/2021    Right triceps repair and right olecranon bursectomy and olceranon bursal excision Dr. Reynaldo Loza [facility] [place] [facility]    • NO PAST SURGERIES         Family History:   Family History   Problem Relation Age of Onset   • No Known Problems Mother    • No Known Problems Father        Social History:   Social History     Socioeconomic History   • Marital status:    Tobacco Use   • Smoking status: Former Smoker     Types: Cigarettes   • Smokeless tobacco: Never Used   Substance and Sexual Activity   • Alcohol use: Yes     Alcohol/week: 3.0 standard drinks     Types: 3 Cans of beer per week   • Drug use: No   • Sexual activity: Defer       Medications:   Current Outpatient Medications:   •  albuterol sulfate  (90 Base) MCG/ACT inhaler, INHALE 2 PUFFS BY MOUTH EVERY 4 HOURS IF NEEDED FOR WHEEZING, Disp: 18 g, Rfl: 3  •  atorvastatin (LIPITOR) 10 MG tablet, TAKE 1 TABLET BY MOUTH DAILY, Disp: 90 tablet, Rfl: 1  •  chlorthalidone (HYGROTON) 25 MG tablet, TAKE 1 TABLET BY MOUTH DAILY, Disp: 90 tablet, Rfl: 1  •  Diclofenac Sodium (VOLTAREN) 1 % gel gel, Apply 4 g topically to the appropriate area as directed 3 (Three) Times a Day., Disp: 100 g, Rfl: 5  •  lisinopril (PRINIVIL,ZESTRIL) 10 MG  tablet, TAKE 1 TABLET BY MOUTH DAILY, Disp: 90 tablet, Rfl: 1  •  loratadine (CLARITIN) 10 MG tablet, Take 1 tablet by mouth Daily., Disp: 90 tablet, Rfl: 1  •  methocarbamol (ROBAXIN) 500 MG tablet, Take 500 mg by mouth 4 (Four) Times a Day., Disp: , Rfl:   •  montelukast (SINGULAIR) 10 MG tablet, Take 1 tablet by mouth Every Evening., Disp: 90 tablet, Rfl: 1  •  potassium chloride (KLOR-CON) 10 MEQ CR tablet, Take 1 tablet by mouth Daily., Disp: 180 tablet, Rfl: 1  •  Symbicort 160-4.5 MCG/ACT inhaler, INHALE 2 PUFFS BY MOUTH TWICE DAILY, Disp: 10.2 g, Rfl: 5    Allergies: No Known Allergies    The following portions of the patient's history were reviewed and updated as appropriate: allergies, current medications, past family history, past medical history, past social history, past surgical history and problem list.        Objective    Objective      Vital Signs:   Vitals:    12/02/21 0841   Temp: 97.1 °F (36.2 °C)       Ortho Exam:  Right elbow well-appearing incision excellent appearing excellent passive and active range of motion intact.  No drainage.  No evidence of bursitis    Results Review:  Imaging Results (Last 24 Hours)     Procedure Component Value Units Date/Time    XR Elbow 2 View Right [[id number]] Resulted: 12/02/21 0906     Updated: 12/02/21 0907    Narrative:      Imaging: elbow x-rays 2 views - AP and lateral elbow x-ray views    Side: RIGHT ELBOW    Indication for elbow x-ray 2 views: elbow pain    Comparison: Prior clinic comparison views available    Findings: no acute bony finding noted, no obvious soft tissue edema  The prior image preoperatively noted significant olecranon bursitis and a   traction osteophyte-both of which are absent now on the postoperative   imaging following bursectomy and removal of osteophyte.    I personally reviewed the above x-rays and discussed with the patient.              Procedures            Assessment / Plan      Assessment/Plan:   Problem List Items Addressed  This Visit        Musculoskeletal and Injuries    Olecranon bursitis of right elbow    Relevant Medications    Diclofenac Sodium (VOLTAREN) 1 % gel gel    Osteophyte of right elbow    Rupture of right triceps tendon       Other    Triceps strain, right, initial encounter      Other Visit Diagnoses     Surgery follow-up    -  Primary    Relevant Orders    XR Elbow 2 View Right (Completed)          Right elbow doing well counseled on passive range of motion active assist range of motion no triceps loading until cleared.  Counseled typically 8 to 12-week heel time.  I will see him back in 6 weeks no x-rays needed    Follow Up: 6 weeks, no x-rays needed      Reynaldo Loza MD, FAAOS  Orthopedic Surgeon  Fellowship Trained Shoulder and Elbow Surgeon  [facility]  Orthopedics and Sports Medicine  [address], [address]  [place] [address]    12/02/21  09:16 EST

## 2021-12-06 ENCOUNTER — TELEPHONE (OUTPATIENT)
Dept: ORTHOPEDIC SURGERY | Facility: CLINIC | Age: 46
End: 2021-12-06

## 2021-12-06 NOTE — TELEPHONE ENCOUNTER
Caller: YASMANY ( [employer])     Relationship: [employer]/C      What was the call regarding: YASMANY IS REQUESTING OP REPORT FROM 11/15, WORK STATUS & DICTATION FROM 12/2     FAX: [phone]

## 2022-01-13 ENCOUNTER — OFFICE VISIT (OUTPATIENT)
Dept: ORTHOPEDIC SURGERY | Facility: CLINIC | Age: 47
End: 2022-01-13

## 2022-01-13 DIAGNOSIS — Z09 SURGERY FOLLOW-UP: Primary | ICD-10-CM

## 2022-01-13 DIAGNOSIS — M25.721 OSTEOPHYTE OF RIGHT ELBOW: ICD-10-CM

## 2022-01-13 DIAGNOSIS — S46.311A TRICEPS STRAIN, RIGHT, INITIAL ENCOUNTER: ICD-10-CM

## 2022-01-13 DIAGNOSIS — M70.21 OLECRANON BURSITIS OF RIGHT ELBOW: ICD-10-CM

## 2022-01-13 DIAGNOSIS — S46.311D RUPTURE OF RIGHT TRICEPS TENDON, SUBSEQUENT ENCOUNTER: ICD-10-CM

## 2022-01-13 PROCEDURE — 99024 POSTOP FOLLOW-UP VISIT: CPT | Performed by: ORTHOPAEDIC SURGERY

## 2022-01-13 NOTE — PROGRESS NOTES
[facility] [place] [facility] Office Follow Up       Office Follow Up Visit       Patient Name: Tc Joshua    Chief Complaint:   Chief Complaint   Patient presents with   • Post-op     6 weeks follow up; 8 weeks status post Right triceps repair and right olecranon bursectomy and olceranon bursal excision 11-15-21       Referring Physician: No ref. provider found    History of Present Illness:   It has been 6  week(s) since Tc Joshua's last visit. Tc Joshua returns to clinic today for F/U: postoperative follow-up of rightBody Part: elbowReason: Right triceps repair and right olecranon bursectomy and olceranon bursal excision . The issue has been ongoing for 5 month(s). Tc Joshua rates HIS/HER: hispain at 3/10 on the pain scale.  Current symptoms:Symptoms: pain and giving way/buckling. The pain is worse with sleeping, lying on affected side and rising from seated position; resting and elevating the extremity improves the pain. Overall, he/she: heis doing better. I have reviewed the patient's history of present illness as noted/entered above.    I have reviewed the patient's past medical history, surgical history, social history, family history, medications, and allergies as noted in the electronic medical record and as noted/entered.  I have reviewed the patient's review of systems as noted/enter and updated as noted in the patient's HPI.    Right elbow -bursectomy, osteophyte excision, triceps repair side to side 11/15/2021      Ended up being a Worker's Compensation injury     1/13/2022:  RIGHT ELBOW  Doing better/improved  2 months, still has some limitations as would be expected at this point      12/2/2021: Right elbow is doing much better he can tell a big difference well-appearing today x-rays confirm removal of bursa and osteophyte     The osteophyte had extruded through the triceps tendon necessitating a side-to-side repair      3 girls  19yr twins and 14yr         Subjective   Subjective      Review of Systems   Constitutional: Negative.  Negative for chills, fatigue and fever.   HENT: Negative.  Negative for congestion and dental problem.    Eyes: Negative.  Negative for blurred vision.   Respiratory: Negative.  Negative for shortness of breath.    Cardiovascular: Negative.  Negative for leg swelling.   Gastrointestinal: Negative.  Negative for abdominal pain.   Endocrine: Negative.  Negative for polyuria.   Genitourinary: Negative.  Negative for difficulty urinating.   Musculoskeletal: Positive for arthralgias.   Skin: Negative.    Allergic/Immunologic: Negative.    Neurological: Negative.    Hematological: Negative.  Negative for adenopathy.   Psychiatric/Behavioral: Negative.  Negative for behavioral problems.        Past Medical History:   Past Medical History:   Diagnosis Date   • Allergic rhinitis    • Asthma        Past Surgical History:   Past Surgical History:   Procedure Laterality Date   • ELBOW PROCEDURE Right 11/15/2021    Right triceps repair and right olecranon bursectomy and olceranon bursal excision Dr. Reynaldo Loza [facility] [place]    • NO PAST SURGERIES         Family History:   Family History   Problem Relation Age of Onset   • No Known Problems Mother    • No Known Problems Father        Social History:   Social History     Socioeconomic History   • Marital status:    Tobacco Use   • Smoking status: Former Smoker     Types: Cigarettes   • Smokeless tobacco: Never Used   Substance and Sexual Activity   • Alcohol use: Yes     Alcohol/week: 3.0 standard drinks     Types: 3 Cans of beer per week   • Drug use: No   • Sexual activity: Defer       Medications:   Current Outpatient Medications:   •  albuterol sulfate  (90 Base) MCG/ACT inhaler, INHALE 2 PUFFS BY MOUTH EVERY 4 HOURS IF NEEDED FOR WHEEZING, Disp: 18 g, Rfl: 3  •  atorvastatin (LIPITOR) 10 MG tablet, TAKE 1 TABLET BY MOUTH DAILY, Disp: 90 tablet, Rfl: 1  •   chlorthalidone (HYGROTON) 25 MG tablet, TAKE 1 TABLET BY MOUTH DAILY, Disp: 90 tablet, Rfl: 1  •  Diclofenac Sodium (VOLTAREN) 1 % gel gel, Apply 4 g topically to the appropriate area as directed 3 (Three) Times a Day., Disp: 100 g, Rfl: 5  •  lisinopril (PRINIVIL,ZESTRIL) 10 MG tablet, TAKE 1 TABLET BY MOUTH DAILY, Disp: 90 tablet, Rfl: 1  •  loratadine (CLARITIN) 10 MG tablet, Take 1 tablet by mouth Daily., Disp: 90 tablet, Rfl: 1  •  methocarbamol (ROBAXIN) 500 MG tablet, Take 500 mg by mouth 4 (Four) Times a Day., Disp: , Rfl:   •  montelukast (SINGULAIR) 10 MG tablet, Take 1 tablet by mouth Every Evening., Disp: 90 tablet, Rfl: 1  •  potassium chloride (KLOR-CON) 10 MEQ CR tablet, Take 1 tablet by mouth Daily., Disp: 180 tablet, Rfl: 1  •  Symbicort 160-4.5 MCG/ACT inhaler, INHALE 2 PUFFS BY MOUTH TWICE DAILY, Disp: 10.2 g, Rfl: 5    Allergies: No Known Allergies    The following portions of the patient's history were reviewed and updated as appropriate: allergies, current medications, past family history, past medical history, past social history, past surgical history and problem list.        Objective    Objective      Vital Signs: There were no vitals filed for this visit.    Ortho Exam:  RIGHT elbow  Incision healed  Triceps intact  Well perfused  SLT intact  Full ROM    Results Review:  Imaging Results (Last 24 Hours)     ** No results found for the last 24 hours. **            Procedures            Assessment / Plan      Assessment/Plan:   Problem List Items Addressed This Visit        Musculoskeletal and Injuries    Olecranon bursitis of right elbow    Relevant Medications    Diclofenac Sodium (VOLTAREN) 1 % gel gel    Osteophyte of right elbow    Rupture of right triceps tendon       Other    Triceps strain, right, initial encounter      Other Visit Diagnoses     Surgery follow-up    -  Primary          RIGHT ELBOW  Doing better  Counseled on the gradual recovery and still healing time and recovery  time to gradually re-strengthen    Discussed return to work in March pending progress    Enjoys gym and has been back and working on things but only gradually with the elbow as expected.      Worker's Compensation-has not reached MMI.  Hopeful for targeting full duty around March.  And possible MMI around that time.    Follow Up: 6 weeks      Reynaldo Loza MD, FAAOS  Orthopedic Surgeon  Fellowship Trained Shoulder and Elbow Surgeon  [facility]  Orthopedics and Sports Medicine  [address], [address]  [place] [address]    01/13/22  09:12 EST

## 2022-01-20 DIAGNOSIS — E78.2 MIXED HYPERLIPIDEMIA: ICD-10-CM

## 2022-01-20 DIAGNOSIS — I10 UNCONTROLLED HYPERTENSION: ICD-10-CM

## 2022-01-20 RX ORDER — CHLORTHALIDONE 25 MG/1
25 TABLET ORAL DAILY
Qty: 90 TABLET | Refills: 1 | Status: SHIPPED | OUTPATIENT
Start: 2022-01-20 | End: 2022-03-08 | Stop reason: SDUPTHER

## 2022-01-20 RX ORDER — LISINOPRIL 10 MG/1
10 TABLET ORAL DAILY
Qty: 90 TABLET | Refills: 1 | Status: SHIPPED | OUTPATIENT
Start: 2022-01-20 | End: 2022-03-08 | Stop reason: SDUPTHER

## 2022-01-20 RX ORDER — ATORVASTATIN CALCIUM 10 MG/1
10 TABLET, FILM COATED ORAL DAILY
Qty: 90 TABLET | Refills: 1 | Status: SHIPPED | OUTPATIENT
Start: 2022-01-20 | End: 2022-03-08 | Stop reason: SDUPTHER

## 2022-02-24 ENCOUNTER — OFFICE VISIT (OUTPATIENT)
Dept: ORTHOPEDIC SURGERY | Facility: CLINIC | Age: 47
End: 2022-02-24

## 2022-02-24 VITALS
HEIGHT: 70 IN | SYSTOLIC BLOOD PRESSURE: 128 MMHG | DIASTOLIC BLOOD PRESSURE: 76 MMHG | WEIGHT: 207.4 LBS | BODY MASS INDEX: 29.69 KG/M2

## 2022-02-24 DIAGNOSIS — M70.21 OLECRANON BURSITIS OF RIGHT ELBOW: ICD-10-CM

## 2022-02-24 DIAGNOSIS — S46.311D RUPTURE OF RIGHT TRICEPS TENDON, SUBSEQUENT ENCOUNTER: ICD-10-CM

## 2022-02-24 DIAGNOSIS — Z09 SURGERY FOLLOW-UP: Primary | ICD-10-CM

## 2022-02-24 DIAGNOSIS — S46.311A TRICEPS STRAIN, RIGHT, INITIAL ENCOUNTER: ICD-10-CM

## 2022-02-24 DIAGNOSIS — M25.721 OSTEOPHYTE OF RIGHT ELBOW: ICD-10-CM

## 2022-02-24 PROCEDURE — 99212 OFFICE O/P EST SF 10 MIN: CPT | Performed by: ORTHOPAEDIC SURGERY

## 2022-02-24 NOTE — PROGRESS NOTES
[facility] [place] [facility] [place]       Office Follow Up Visit       Patient Name: Tc Joshua    Chief Complaint:   Chief Complaint   Patient presents with   • Follow-up     6 week f/u; 3 months s/p Right triceps repair and right olecranon bursectomy and olceranon bursal excision 11-15-21       Referring Physician: No ref. provider found    History of Present Illness:   It has been 3  month(s) since Tc Joshua's last visit. Tc Joshua returns to clinic today for F/U: follow-up of rightBody Part: elbowReason: pain. The issue has been ongoing for 6 month(s). Tc Joshua rates HIS/HER: hispain at 1/10 on the pain scale. Previous/current treatments: NSAIDS. Current symptoms:Symptoms: none. The pain is worse with lying on affected side; movement improves the pain. Overall, he/she: heis doing better.  I have reviewed the patient's history of present illness as noted/entered above.    I have reviewed the patient's past medical history, surgical history, social history, family history, medications, and allergies as noted in the electronic medical record and as noted/entered.  I have reviewed the patient's review of systems as noted/enter and updated as noted in the patient's HPI.    Right elbow -bursectomy, osteophyte excision, triceps repair side to side 11/15/2021        Ended up being a Worker's Compensation injury    2/24/2022:  Right elbow improved/doing better.  3 months postop  He making gains     1/13/2022:  RIGHT ELBOW  Doing better/improved  2 months, still has some limitations as would be expected at this point        12/2/2021: Right elbow is doing much better he can tell a big difference well-appearing today x-rays confirm removal of bursa and osteophyte     The osteophyte had extruded through the triceps tendon necessitating a side-to-side repair        3 girls 19yr twins and 14yr       Subjective   Subjective      Review of Systems    Constitutional: Negative.    HENT: Negative.    Eyes: Negative.    Respiratory: Negative.    Cardiovascular: Negative.    Gastrointestinal: Negative.    Endocrine: Negative.    Genitourinary: Negative.    Musculoskeletal: Positive for arthralgias.   Skin: Negative.    Allergic/Immunologic: Negative.    Neurological: Negative.    Hematological: Negative.    Psychiatric/Behavioral: Negative.         Past Medical History:   Past Medical History:   Diagnosis Date   • Allergic rhinitis    • Asthma        Past Surgical History:   Past Surgical History:   Procedure Laterality Date   • ELBOW PROCEDURE Right 11/15/2021    Right triceps repair and right olecranon bursectomy and olceranon bursal excision Dr. Reynaldo Loza [facility]    • NO PAST SURGERIES         Family History:   Family History   Problem Relation Age of Onset   • No Known Problems Mother    • No Known Problems Father        Social History:   Social History     Socioeconomic History   • Marital status:    Tobacco Use   • Smoking status: Former Smoker     Types: Cigarettes   • Smokeless tobacco: Never Used   Substance and Sexual Activity   • Alcohol use: Yes     Alcohol/week: 3.0 standard drinks     Types: 3 Cans of beer per week   • Drug use: No   • Sexual activity: Defer       Medications:   Current Outpatient Medications:   •  albuterol sulfate  (90 Base) MCG/ACT inhaler, INHALE 2 PUFFS BY MOUTH EVERY 4 HOURS IF NEEDED FOR WHEEZING, Disp: 18 g, Rfl: 3  •  atorvastatin (LIPITOR) 10 MG tablet, TAKE 1 TABLET BY MOUTH DAILY, Disp: 90 tablet, Rfl: 1  •  chlorthalidone (HYGROTON) 25 MG tablet, TAKE 1 TABLET BY MOUTH DAILY, Disp: 90 tablet, Rfl: 1  •  Diclofenac Sodium (VOLTAREN) 1 % gel gel, Apply 4 g topically to the appropriate area as directed 3 (Three) Times a Day., Disp: 100 g, Rfl: 5  •  lisinopril (PRINIVIL,ZESTRIL) 10 MG tablet, TAKE 1 TABLET BY MOUTH DAILY, Disp: 90 tablet, Rfl: 1  •  loratadine (CLARITIN) 10 MG tablet, Take 1 tablet by mouth  "Daily., Disp: 90 tablet, Rfl: 1  •  methocarbamol (ROBAXIN) 500 MG tablet, Take 500 mg by mouth 4 (Four) Times a Day., Disp: , Rfl:   •  montelukast (SINGULAIR) 10 MG tablet, Take 1 tablet by mouth Every Evening., Disp: 90 tablet, Rfl: 1  •  potassium chloride (KLOR-CON) 10 MEQ CR tablet, Take 1 tablet by mouth Daily., Disp: 180 tablet, Rfl: 1  •  Symbicort 160-4.5 MCG/ACT inhaler, INHALE 2 PUFFS BY MOUTH TWICE DAILY, Disp: 10.2 g, Rfl: 5    Allergies: No Known Allergies    The following portions of the patient's history were reviewed and updated as appropriate: allergies, current medications, past family history, past medical history, past social history, past surgical history and problem list.        Objective    Objective      Vital Signs:   Vitals:    02/24/22 0849   BP: 128/76   Weight: 94.1 kg (207 lb 6.4 oz)   Height: 177.8 cm (70\")       Ortho Exam:  Right elbow incision is completely healed excellent appearing bursitis has resolved no evidence of bursal fluid.  Excellent triceps strength no perceptible weakness but understanding that he still recovering.    Results Review:  Imaging Results (Last 24 Hours)     ** No results found for the last 24 hours. **          Procedures            Assessment / Plan      Assessment/Plan:   Problem List Items Addressed This Visit        Musculoskeletal and Injuries    Olecranon bursitis of right elbow    Relevant Medications    Diclofenac Sodium (VOLTAREN) 1 % gel gel    Osteophyte of right elbow    Rupture of right triceps tendon       Other    Triceps strain, right, initial encounter      Other Visit Diagnoses     Surgery follow-up    -  Primary        Work anticipate full duty return and letter provided for 3/14/2022.  He will continue to work on gradual strengthening along the way.    I will see him back in 6 weeks with anticipation of declaration of MMI at that point I would like him to be a return to work full duty to ensure that he has a safe return in the same " back to follow.    Follow Up: 6 weeks, no x-rays needed      Reynaldo Loza MD, FAAOS  Orthopedic Surgeon  Fellowship Trained Shoulder and Elbow Surgeon  [facility]  Orthopedics and Sports Medicine  [address], [address]  [place] [address]    02/24/22  09:19 EST

## 2022-02-25 DIAGNOSIS — E87.6 HYPOKALEMIA: ICD-10-CM

## 2022-02-25 DIAGNOSIS — J45.40 MODERATE PERSISTENT ASTHMA WITHOUT COMPLICATION: ICD-10-CM

## 2022-02-25 RX ORDER — POTASSIUM CHLORIDE 750 MG/1
TABLET, FILM COATED, EXTENDED RELEASE ORAL
Qty: 180 TABLET | Refills: 1 | Status: SHIPPED | OUTPATIENT
Start: 2022-02-25 | End: 2022-03-08 | Stop reason: SDUPTHER

## 2022-02-25 RX ORDER — MONTELUKAST SODIUM 10 MG/1
10 TABLET ORAL EVERY EVENING
Qty: 90 TABLET | Refills: 1 | Status: SHIPPED | OUTPATIENT
Start: 2022-02-25 | End: 2022-03-08 | Stop reason: SDUPTHER

## 2022-03-08 ENCOUNTER — OFFICE VISIT (OUTPATIENT)
Dept: INTERNAL MEDICINE | Facility: CLINIC | Age: 47
End: 2022-03-08

## 2022-03-08 ENCOUNTER — TELEPHONE (OUTPATIENT)
Dept: INTERNAL MEDICINE | Facility: CLINIC | Age: 47
End: 2022-03-08

## 2022-03-08 ENCOUNTER — LAB (OUTPATIENT)
Dept: LAB | Facility: HOSPITAL | Age: 47
End: 2022-03-08

## 2022-03-08 VITALS
HEART RATE: 70 BPM | BODY MASS INDEX: 30.49 KG/M2 | DIASTOLIC BLOOD PRESSURE: 90 MMHG | HEIGHT: 70 IN | WEIGHT: 213 LBS | OXYGEN SATURATION: 97 % | SYSTOLIC BLOOD PRESSURE: 132 MMHG

## 2022-03-08 DIAGNOSIS — E87.6 HYPOKALEMIA: ICD-10-CM

## 2022-03-08 DIAGNOSIS — M70.21 OLECRANON BURSITIS OF RIGHT ELBOW: ICD-10-CM

## 2022-03-08 DIAGNOSIS — M21.611 BUNION OF RIGHT FOOT: ICD-10-CM

## 2022-03-08 DIAGNOSIS — Z00.00 ROUTINE GENERAL MEDICAL EXAMINATION AT A HEALTH CARE FACILITY: Primary | ICD-10-CM

## 2022-03-08 DIAGNOSIS — E78.2 MIXED HYPERLIPIDEMIA: ICD-10-CM

## 2022-03-08 DIAGNOSIS — I10 UNCONTROLLED HYPERTENSION: ICD-10-CM

## 2022-03-08 DIAGNOSIS — Z00.00 ROUTINE GENERAL MEDICAL EXAMINATION AT A HEALTH CARE FACILITY: ICD-10-CM

## 2022-03-08 DIAGNOSIS — J45.40 MODERATE PERSISTENT ASTHMA WITHOUT COMPLICATION: ICD-10-CM

## 2022-03-08 LAB
ALBUMIN SERPL-MCNC: 5 G/DL (ref 3.5–5.2)
ALBUMIN/GLOB SERPL: 1.5 G/DL
ALP SERPL-CCNC: 77 U/L (ref 39–117)
ALT SERPL W P-5'-P-CCNC: 43 U/L (ref 1–41)
ANION GAP SERPL CALCULATED.3IONS-SCNC: 11.6 MMOL/L (ref 5–15)
AST SERPL-CCNC: 41 U/L (ref 1–40)
BILIRUB BLD-MCNC: NEGATIVE MG/DL
BILIRUB SERPL-MCNC: 0.4 MG/DL (ref 0–1.2)
BUN SERPL-MCNC: 16 MG/DL (ref 6–20)
BUN/CREAT SERPL: 13.3 (ref 7–25)
CALCIUM SPEC-SCNC: 10.2 MG/DL (ref 8.6–10.5)
CHLORIDE SERPL-SCNC: 98 MMOL/L (ref 98–107)
CHOLEST SERPL-MCNC: 198 MG/DL (ref 0–200)
CLARITY, POC: CLEAR
CO2 SERPL-SCNC: 31.4 MMOL/L (ref 22–29)
COLOR UR: ABNORMAL
CREAT SERPL-MCNC: 1.2 MG/DL (ref 0.76–1.27)
DEPRECATED RDW RBC AUTO: 41.8 FL (ref 37–54)
EGFRCR SERPLBLD CKD-EPI 2021: 75.5 ML/MIN/1.73
ERYTHROCYTE [DISTWIDTH] IN BLOOD BY AUTOMATED COUNT: 13.6 % (ref 12.3–15.4)
EXPIRATION DATE: ABNORMAL
GLOBULIN UR ELPH-MCNC: 3.3 GM/DL
GLUCOSE SERPL-MCNC: 96 MG/DL (ref 65–99)
GLUCOSE UR STRIP-MCNC: NEGATIVE MG/DL
HCT VFR BLD AUTO: 47.1 % (ref 37.5–51)
HDLC SERPL-MCNC: 46 MG/DL (ref 40–60)
HGB BLD-MCNC: 16 G/DL (ref 13–17.7)
KETONES UR QL: NEGATIVE
LDLC SERPL CALC-MCNC: 139 MG/DL (ref 0–100)
LDLC/HDLC SERPL: 3 {RATIO}
LEUKOCYTE EST, POC: NEGATIVE
Lab: ABNORMAL
MCH RBC QN AUTO: 28.8 PG (ref 26.6–33)
MCHC RBC AUTO-ENTMCNC: 34 G/DL (ref 31.5–35.7)
MCV RBC AUTO: 84.9 FL (ref 79–97)
NITRITE UR-MCNC: NEGATIVE MG/ML
PH UR: 6 [PH] (ref 5–8)
PLATELET # BLD AUTO: 232 10*3/MM3 (ref 140–450)
PMV BLD AUTO: 10.6 FL (ref 6–12)
POTASSIUM SERPL-SCNC: 3.7 MMOL/L (ref 3.5–5.2)
PROT SERPL-MCNC: 8.3 G/DL (ref 6–8.5)
PROT UR STRIP-MCNC: ABNORMAL MG/DL
RBC # BLD AUTO: 5.55 10*6/MM3 (ref 4.14–5.8)
RBC # UR STRIP: NEGATIVE /UL
SODIUM SERPL-SCNC: 141 MMOL/L (ref 136–145)
SP GR UR: 1.02 (ref 1–1.03)
TRIGL SERPL-MCNC: 69 MG/DL (ref 0–150)
TSH SERPL DL<=0.05 MIU/L-ACNC: 0.47 UIU/ML (ref 0.27–4.2)
UROBILINOGEN UR QL: NORMAL
VLDLC SERPL-MCNC: 13 MG/DL (ref 5–40)
WBC NRBC COR # BLD: 4.6 10*3/MM3 (ref 3.4–10.8)

## 2022-03-08 PROCEDURE — 99396 PREV VISIT EST AGE 40-64: CPT | Performed by: INTERNAL MEDICINE

## 2022-03-08 PROCEDURE — 80050 GENERAL HEALTH PANEL: CPT

## 2022-03-08 PROCEDURE — 81003 URINALYSIS AUTO W/O SCOPE: CPT | Performed by: INTERNAL MEDICINE

## 2022-03-08 PROCEDURE — 82607 VITAMIN B-12: CPT

## 2022-03-08 PROCEDURE — 80061 LIPID PANEL: CPT

## 2022-03-08 PROCEDURE — 82306 VITAMIN D 25 HYDROXY: CPT

## 2022-03-08 RX ORDER — CHLORTHALIDONE 25 MG/1
25 TABLET ORAL DAILY
Qty: 90 TABLET | Refills: 3 | Status: SHIPPED | OUTPATIENT
Start: 2022-03-08 | End: 2023-03-14 | Stop reason: SDUPTHER

## 2022-03-08 RX ORDER — POTASSIUM CHLORIDE 750 MG/1
10 TABLET, FILM COATED, EXTENDED RELEASE ORAL DAILY
Qty: 180 TABLET | Refills: 3 | Status: SHIPPED | OUTPATIENT
Start: 2022-03-08 | End: 2022-12-03 | Stop reason: SDUPTHER

## 2022-03-08 RX ORDER — ATORVASTATIN CALCIUM 10 MG/1
10 TABLET, FILM COATED ORAL DAILY
Qty: 90 TABLET | Refills: 3 | Status: SHIPPED | OUTPATIENT
Start: 2022-03-08 | End: 2023-03-14 | Stop reason: SDUPTHER

## 2022-03-08 RX ORDER — BUDESONIDE AND FORMOTEROL FUMARATE DIHYDRATE 160; 4.5 UG/1; UG/1
2 AEROSOL RESPIRATORY (INHALATION) 2 TIMES DAILY
Qty: 10.2 G | Refills: 5 | Status: SHIPPED | OUTPATIENT
Start: 2022-03-08 | End: 2022-05-18

## 2022-03-08 RX ORDER — LISINOPRIL 10 MG/1
10 TABLET ORAL DAILY
Qty: 90 TABLET | Refills: 3 | Status: SHIPPED | OUTPATIENT
Start: 2022-03-08 | End: 2022-03-08

## 2022-03-08 RX ORDER — LISINOPRIL 20 MG/1
20 TABLET ORAL DAILY
Qty: 90 TABLET | Refills: 1 | Status: SHIPPED | OUTPATIENT
Start: 2022-03-08 | End: 2022-11-01 | Stop reason: SDUPTHER

## 2022-03-08 RX ORDER — MONTELUKAST SODIUM 10 MG/1
10 TABLET ORAL EVERY EVENING
Qty: 90 TABLET | Refills: 3 | Status: SHIPPED | OUTPATIENT
Start: 2022-03-08 | End: 2023-03-14 | Stop reason: SDUPTHER

## 2022-03-08 RX ORDER — LORATADINE 10 MG/1
10 TABLET ORAL DAILY
Qty: 90 TABLET | Refills: 3 | Status: SHIPPED | OUTPATIENT
Start: 2022-03-08 | End: 2022-09-28

## 2022-03-08 RX ORDER — ALBUTEROL SULFATE 90 UG/1
2 AEROSOL, METERED RESPIRATORY (INHALATION) EVERY 4 HOURS PRN
Qty: 18 G | Refills: 3 | Status: SHIPPED | OUTPATIENT
Start: 2022-03-08 | End: 2022-05-18

## 2022-03-08 NOTE — PROGRESS NOTES
Chief Complaint   Patient presents with   • Annual Exam       History of Present Illness  HM, Adult Male:  The patient is being seen for a health maintenance evaluation. The last health maintenance visit was 1 year(s) ago.   Social History: Household members include spouse, . He is  with 3 children, twins are 19 and yougest dtr is 14. Work status: working full time, now in [employer]. The patient has never smoked cigarettes. He reports never drinking alcohol. He has never used illicit drugs.   General Health: The patient's health is described as good. He has regular dental visits. He denies vision problems. He denies hearing loss. Immunizations status: up to date.   Lifestyle:. He consumes a diverse and healthy diet. He does not have any weight concerns. He exercises regularly, but has cut down going to the gym. He does not use tobacco. He denies alcohol use. He denies drug use.   Screening: Cancer screening reviewed and current.   Metabolic screening reviewed and current.   Risk screening reviewed and current.     S/p right elbow surgery - triceps repair and olecranon bursectomy and bursal excision, doing much better. Will be back to work 3/14- was deemed workmans comp.    Review of Systems   Constitutional: Negative.  Negative for chills, fatigue and fever.   HENT: Negative for congestion, ear discharge, ear pain, sinus pressure and sore throat.    Eyes: Negative for pain, redness and visual disturbance.   Respiratory: Negative for cough, chest tightness and shortness of breath.    Cardiovascular: Negative for chest pain, palpitations and leg swelling.   Gastrointestinal: Negative for abdominal pain, diarrhea, nausea and vomiting.   Endocrine: Negative for cold intolerance and heat intolerance.   Genitourinary: Negative for flank pain and urgency.   Musculoskeletal: Negative for arthralgias, back pain, gait problem and myalgias.   Skin: Negative for pallor and rash.   Neurological: Negative for dizziness,  "syncope, weakness and headaches.   Psychiatric/Behavioral: Negative for confusion, dysphoric mood and sleep disturbance. The patient is not nervous/anxious.        Patient Active Problem List   Diagnosis   • Allergic rhinitis   • Asthma   • Essential hypertension   • Mixed hyperlipidemia   • Hypokalemia   • Olecranon bursitis of right elbow   • Triceps strain, right, initial encounter   • Osteophyte of right elbow   • Rupture of right triceps tendon       Social History     Socioeconomic History   • Marital status:    Tobacco Use   • Smoking status: Former Smoker     Types: Cigarettes   • Smokeless tobacco: Never Used   Substance and Sexual Activity   • Alcohol use: Yes     Alcohol/week: 3.0 standard drinks     Types: 3 Cans of beer per week   • Drug use: No   • Sexual activity: Defer       No current outpatient medications on file prior to visit.     No current facility-administered medications on file prior to visit.       No Known Allergies    /90   Pulse 70   Ht 177.8 cm (70\")   Wt 96.6 kg (213 lb)   SpO2 97% Comment: ra  BMI 30.56 kg/m²          The following portions of the patient's history were reviewed and updated as appropriate: allergies, current medications, past family history, past medical history, past social history, past surgical history and problem list.    Physical Exam  Constitutional:       General: He is not in acute distress.     Appearance: Normal appearance.   HENT:      Head: Normocephalic and atraumatic.      Right Ear: Tympanic membrane and external ear normal.      Left Ear: Tympanic membrane and external ear normal.      Nose: Nose normal.      Mouth/Throat:      Mouth: Mucous membranes are moist.   Eyes:      General: No scleral icterus.  Neck:      Vascular: No carotid bruit.   Cardiovascular:      Rate and Rhythm: Normal rate and regular rhythm.      Pulses: Normal pulses.      Heart sounds: Normal heart sounds. No murmur heard.    No friction rub. No gallop. "   Pulmonary:      Effort: Pulmonary effort is normal.      Breath sounds: Normal breath sounds. No rhonchi or rales.   Abdominal:      General: Bowel sounds are normal. There is no distension.      Palpations: Abdomen is soft.      Tenderness: There is no right CVA tenderness, left CVA tenderness, guarding or rebound.      Hernia: No hernia is present.   Musculoskeletal:         General: No tenderness. Normal range of motion.      Cervical back: Normal range of motion.      Right lower leg: No edema.      Left lower leg: No edema.   Lymphadenopathy:      Cervical: No cervical adenopathy.   Skin:     General: Skin is warm.      Findings: No rash.   Neurological:      General: No focal deficit present.      Mental Status: He is alert and oriented to person, place, and time. Mental status is at baseline.      Cranial Nerves: No cranial nerve deficit.      Sensory: No sensory deficit.      Coordination: Coordination normal.      Gait: Gait normal.      Deep Tendon Reflexes: Reflexes normal.   Psychiatric:         Mood and Affect: Mood normal.         Behavior: Behavior normal.         Results for orders placed or performed in visit on 03/08/22   POCT urinalysis dipstick, automated    Specimen: Urine   Result Value Ref Range    Color Dark Yellow Yellow, Straw, Dark Yellow, Hanna    Clarity, UA Clear Clear    Specific Gravity  1.025 1.005 - 1.030    pH, Urine 6.0 5.0 - 8.0    Leukocytes Negative Negative    Nitrite, UA Negative Negative    Protein, POC Trace (A) Negative mg/dL    Glucose, UA Negative Negative, 1000 mg/dL (3+) mg/dL    Ketones, UA Negative Negative    Urobilinogen, UA Normal Normal    Bilirubin Negative Negative    Blood, UA Negative Negative    Lot Number [id number]     Expiration Date 10/21/23        Diagnoses and all orders for this visit:    1. Routine general medical examination at a health care facility (Primary)  -     POCT urinalysis dipstick, automated  -     CBC (No Diff); Future  -      Comprehensive Metabolic Panel; Future  -     Lipid Panel; Future  -     TSH; Future  -     Vitamin D 25 Hydroxy; Future  -     Vitamin B12; Future    2. Moderate persistent asthma without complication  -     albuterol sulfate  (90 Base) MCG/ACT inhaler; Inhale 2 puffs Every 4 (Four) Hours As Needed for Wheezing.  Dispense: 18 g; Refill: 3  -     loratadine (CLARITIN) 10 MG tablet; Take 1 tablet by mouth Daily.  Dispense: 90 tablet; Refill: 3  -     montelukast (SINGULAIR) 10 MG tablet; Take 1 tablet by mouth Every Evening.  Dispense: 90 tablet; Refill: 3  -     budesonide-formoterol (Symbicort) 160-4.5 MCG/ACT inhaler; Inhale 2 puffs 2 (Two) Times a Day.  Dispense: 10.2 g; Refill: 5    3. Mixed hyperlipidemia  -     atorvastatin (LIPITOR) 10 MG tablet; Take 1 tablet by mouth Daily.  Dispense: 90 tablet; Refill: 3    4. Uncontrolled hypertension  -     chlorthalidone (HYGROTON) 25 MG tablet; Take 1 tablet by mouth Daily.  Dispense: 90 tablet; Refill: 3  -     Discontinue: lisinopril (PRINIVIL,ZESTRIL) 10 MG tablet; Take 1 tablet by mouth Daily.  Dispense: 90 tablet; Refill: 3  -     lisinopril (PRINIVIL,ZESTRIL) 20 MG tablet; Take 1 tablet by mouth Daily.  Dispense: 90 tablet; Refill: 1    5. Olecranon bursitis of right elbow  -     Diclofenac Sodium (VOLTAREN) 1 % gel gel; Apply 4 g topically to the appropriate area as directed 3 (Three) Times a Day.  Dispense: 100 g; Refill: 5    6. Bunion of right foot  -     Diclofenac Sodium (VOLTAREN) 1 % gel gel; Apply 4 g topically to the appropriate area as directed 3 (Three) Times a Day.  Dispense: 100 g; Refill: 5    7. Hypokalemia  -     potassium chloride 10 MEQ CR tablet; Take 1 tablet by mouth Daily.  Dispense: 180 tablet; Refill: 3        Health Maintenance   Topic Date Due   • Pneumococcal Vaccine 0-64 (1 of 2 - PPSV23) Never done   • COVID-19 Vaccine (3 - Booster for Pfizer series) 09/17/2021   • INFLUENZA VACCINE  03/08/2023 (Originally 8/1/2021)   • LIPID  PANEL  03/08/2023   • ANNUAL PHYSICAL  03/09/2023   • COLORECTAL CANCER SCREENING  04/09/2024   • TDAP/TD VACCINES (2 - Td or Tdap) 11/26/2029   • HEPATITIS C SCREENING  Completed       Discussion/Summary  Impression: health maintenance visit, healthy adult male.   Currently, he eats a healthy diet and has an adequate exercise regimen.   Prostate cancer screening: PSA was ordered.   Testicular cancer screening: monthly self testicular exam was advised.   Colorectal cancer screening: fecal occult blood testing is needed every year and colonoscopy is utd .   CT low dose screen-not indicated  Screening lab work includes glucose, lipid profile and 25-hydroxyvitamin D.   The immunizations are up to date.   Advice and education were given regarding cardiovascular risk reduction, healthy dietary habits, Seatbelt and helmet use and self skin examination.        Return in about 6 months (around 9/8/2022).    Electronically signed by:    Alexandrea Hanna MD

## 2022-03-08 NOTE — TELEPHONE ENCOUNTER
Pt advised, wanted to know if he should take 1 po bid, advised no to take both 10mg in the morning until tomg is finished, then to get 20mg 1 po qd, pt states verbal understanding and thanked me.

## 2022-03-08 NOTE — TELEPHONE ENCOUNTER
Pt states he thought his lisinopril was to be increased, his script was sent in for 10mg today.  Please advise

## 2022-03-08 NOTE — TELEPHONE ENCOUNTER
Caller: Tc Joshua    Relationship: Self    Best call back number: [phone]    What is the best time to reach you: ANYTIME     Who are you requesting to speak with (clinical staff, provider,  specific staff member):     What was the call regarding: PATIENT IS CALLING IN REGARDS TO THE CHANGE IN HIS DOSAGE FOR THE LISINOPRIL. HE SAYS HE RECENTLY REFILLED THIS MEDICATION AND WOULD LIKE TO KNOW HER INSTRUCTIONS ON WHAT HE SHOULD DO ABOUT HIS CURRENT SUPPLY BEING THE NOW PREVIOUS DOSE.     Do you require a callback: YES

## 2022-03-08 NOTE — TELEPHONE ENCOUNTER
Sorry, was to increase it to 20 mg. Please have him finish his current supply by taking 2 daily.    thanks

## 2022-03-09 LAB
25(OH)D3 SERPL-MCNC: 19.6 NG/ML (ref 30–100)
VIT B12 BLD-MCNC: 736 PG/ML (ref 211–946)

## 2022-03-14 RX ORDER — ERGOCALCIFEROL 1.25 MG/1
50000 CAPSULE ORAL WEEKLY
Qty: 5 CAPSULE | Refills: 4 | Status: SHIPPED | OUTPATIENT
Start: 2022-03-14 | End: 2022-08-25

## 2022-04-08 ENCOUNTER — OFFICE VISIT (OUTPATIENT)
Dept: ORTHOPEDIC SURGERY | Facility: CLINIC | Age: 47
End: 2022-04-08

## 2022-04-08 VITALS
BODY MASS INDEX: 30.24 KG/M2 | WEIGHT: 211.2 LBS | DIASTOLIC BLOOD PRESSURE: 88 MMHG | SYSTOLIC BLOOD PRESSURE: 132 MMHG | HEIGHT: 70 IN

## 2022-04-08 DIAGNOSIS — M25.721 OSTEOPHYTE OF RIGHT ELBOW: ICD-10-CM

## 2022-04-08 DIAGNOSIS — M70.21 OLECRANON BURSITIS OF RIGHT ELBOW: ICD-10-CM

## 2022-04-08 DIAGNOSIS — Z09 SURGERY FOLLOW-UP: Primary | ICD-10-CM

## 2022-04-08 DIAGNOSIS — S46.311A TRICEPS STRAIN, RIGHT, INITIAL ENCOUNTER: ICD-10-CM

## 2022-04-08 DIAGNOSIS — S46.311D RUPTURE OF RIGHT TRICEPS TENDON, SUBSEQUENT ENCOUNTER: ICD-10-CM

## 2022-04-08 PROCEDURE — 99212 OFFICE O/P EST SF 10 MIN: CPT | Performed by: ORTHOPAEDIC SURGERY

## 2022-04-08 NOTE — PROGRESS NOTES
[facility] [place] Orthopaedic Surgery Office Follow Up       Office Follow Up Visit       Patient Name: Tc Joshua    Chief Complaint:   Chief Complaint   Patient presents with   • Follow-up     6 week follow up; 5 months s/p Right triceps repair and right olecranon bursectomy and olceranon bursal excision 11-15-21       Referring Physician: No ref. provider found    History of Present Illness:   It has been 6  week(s) since Tc Joshua's last visit. Tc Joshua returns to clinic today for F/U: follow-up of rightBody Part: elbowReason: right tricep repair with right olecranon bursectomy and olecranon bursal excision. The issue has been ongoing for 5 month(s). Tc Joshua rates HIS/HER: hispain at 2/10 on the pain scale. Previous/current treatments: NSAIDS and physical therapy. Current symptoms:Symptoms: pain. The pain is worse with working; resting improves the pain. Overall, he/she: heis doing better.  I have reviewed the patient's history of present illness as noted/entered above.    I have reviewed the patient's past medical history, surgical history, social history, family history, medications, and allergies as noted in the electronic medical record and as noted/entered.  I have reviewed the patient's review of systems as noted/enter and updated as noted in the patient's HPI.    Right elbow -bursectomy, osteophyte excision, triceps repair side to side 11/15/2021        Ended up being a Worker's Compensation injury     2/24/2022:  Right elbow improved/doing better.  3 months postop  He making gains     1/13/2022:  RIGHT ELBOW  Doing better/improved  2 months, still has some limitations as would be expected at this point        12/2/2021: Right elbow is doing much better he can tell a big difference well-appearing today x-rays confirm removal of bursa and osteophyte     The osteophyte had extruded through the triceps tendon necessitating a side-to-side  repair        3 girls 19yr twins and 14yr     4/8/2022:  RIGHT Elbow  Improved/better  Full duty/letter for return 3/14/2022    Enjoys: [place] with family      Subjective   Subjective      Review of Systems   Constitutional: Negative.  Negative for chills, fatigue and fever.   HENT: Negative.  Negative for congestion and dental problem.    Eyes: Negative.  Negative for blurred vision.   Respiratory: Negative.  Negative for shortness of breath.    Cardiovascular: Negative.  Negative for leg swelling.   Gastrointestinal: Negative.  Negative for abdominal pain.   Endocrine: Negative.  Negative for polyuria.   Genitourinary: Negative.  Negative for difficulty urinating.   Musculoskeletal: Positive for arthralgias.   Skin: Negative.    Allergic/Immunologic: Negative.    Neurological: Negative.    Hematological: Negative.  Negative for adenopathy.   Psychiatric/Behavioral: Negative.  Negative for behavioral problems.        Past Medical History:   Past Medical History:   Diagnosis Date   • Allergic rhinitis    • Asthma        Past Surgical History:   Past Surgical History:   Procedure Laterality Date   • ELBOW PROCEDURE Right 11/15/2021    Right triceps repair and right olecranon bursectomy and olceranon bursal excision Dr. Reynaldo Loza [facility] [place]    • NO PAST SURGERIES         Family History:   Family History   Problem Relation Age of Onset   • No Known Problems Mother    • No Known Problems Father        Social History:   Social History     Socioeconomic History   • Marital status:    Tobacco Use   • Smoking status: Former Smoker     Types: Cigarettes   • Smokeless tobacco: Never Used   Vaping Use   • Vaping Use: Never used   Substance and Sexual Activity   • Alcohol use: Yes     Alcohol/week: 3.0 standard drinks     Types: 3 Cans of beer per week   • Drug use: No   • Sexual activity: Defer       Medications:   Current Outpatient Medications:   •  albuterol sulfate  (90 Base) MCG/ACT inhaler, Inhale 2 puffs  "Every 4 (Four) Hours As Needed for Wheezing., Disp: 18 g, Rfl: 3  •  atorvastatin (LIPITOR) 10 MG tablet, Take 1 tablet by mouth Daily., Disp: 90 tablet, Rfl: 3  •  budesonide-formoterol (Symbicort) 160-4.5 MCG/ACT inhaler, Inhale 2 puffs 2 (Two) Times a Day., Disp: 10.2 g, Rfl: 5  •  chlorthalidone (HYGROTON) 25 MG tablet, Take 1 tablet by mouth Daily., Disp: 90 tablet, Rfl: 3  •  Diclofenac Sodium (VOLTAREN) 1 % gel gel, Apply 4 g topically to the appropriate area as directed 3 (Three) Times a Day., Disp: 100 g, Rfl: 5  •  lisinopril (PRINIVIL,ZESTRIL) 20 MG tablet, Take 1 tablet by mouth Daily., Disp: 90 tablet, Rfl: 1  •  loratadine (CLARITIN) 10 MG tablet, Take 1 tablet by mouth Daily., Disp: 90 tablet, Rfl: 3  •  montelukast (SINGULAIR) 10 MG tablet, Take 1 tablet by mouth Every Evening., Disp: 90 tablet, Rfl: 3  •  potassium chloride 10 MEQ CR tablet, Take 1 tablet by mouth Daily., Disp: 180 tablet, Rfl: 3  •  vitamin D (ERGOCALCIFEROL) 1.25 MG (52331 UT) capsule capsule, Take 1 capsule by mouth 1 (One) Time Per Week., Disp: 5 capsule, Rfl: 4    Allergies: No Known Allergies    The following portions of the patient's history were reviewed and updated as appropriate: allergies, current medications, past family history, past medical history, past social history, past surgical history and problem list.        Objective    Objective      Vital Signs:   Vitals:    04/08/22 0806   BP: 132/88   Weight: 95.8 kg (211 lb 3.2 oz)   Height: 177.8 cm (70\")       Ortho Exam:  RIGHT ELBOW  Incision healed  5/5 triceps strength --incredibly strong he still perceives some slight side-to-side difference in the gym but overall has made incredible strides and imperceptible on clinical exam    Results Review:  Imaging Results (Last 24 Hours)     ** No results found for the last 24 hours. **          Procedures            Assessment / Plan      Assessment/Plan:   Problem List Items Addressed This Visit        Musculoskeletal and " Injuries    Olecranon bursitis of right elbow    Relevant Medications    Diclofenac Sodium (VOLTAREN) 1 % gel gel    Osteophyte of right elbow    Rupture of right triceps tendon       Other    Triceps strain, right, initial encounter      Other Visit Diagnoses     Surgery follow-up    -  Primary        Right elbow status post work comp injury.  He has reached MMI today 4/8/2022 he is already been on full duty since 3/14/2022 he will continue full duty follow-up as needed    Follow Up: As needed      Reynaldo Loza MD, FAAOS  Orthopedic Surgeon  Fellowship Trained Shoulder and Elbow Surgeon  [facility]  Orthopedics and Sports Medicine  [address] [place] [address], [address]  [place] [address]    04/08/22  08:30 EDT

## 2022-05-18 DIAGNOSIS — J45.40 MODERATE PERSISTENT ASTHMA WITHOUT COMPLICATION: ICD-10-CM

## 2022-05-18 RX ORDER — BUDESONIDE AND FORMOTEROL FUMARATE DIHYDRATE 160; 4.5 UG/1; UG/1
AEROSOL RESPIRATORY (INHALATION)
Qty: 10.2 G | Refills: 5 | Status: SHIPPED | OUTPATIENT
Start: 2022-05-18 | End: 2022-11-01 | Stop reason: SDUPTHER

## 2022-05-18 RX ORDER — ALBUTEROL SULFATE 90 UG/1
AEROSOL, METERED RESPIRATORY (INHALATION)
Qty: 18 G | Refills: 3 | Status: SHIPPED | OUTPATIENT
Start: 2022-05-18 | End: 2022-11-01 | Stop reason: SDUPTHER

## 2022-06-06 ENCOUNTER — TELEPHONE (OUTPATIENT)
Dept: ORTHOPEDIC SURGERY | Facility: CLINIC | Age: 47
End: 2022-06-06

## 2022-06-06 NOTE — TELEPHONE ENCOUNTER
Caller: YASMANY HOWARD    Relationship: WORK [employer] - [employer]    Best call back number: [phone]    What form or medical record are you requesting: INFORMATION REQUESTED OF OFFICE ON 5.3.2022 FAX.     Who is requesting this form or medical record from you: [employer]    How would you like to receive the form or medical records (pick-up, mail, fax): FAX  If fax, what is the fax number: [phone]    Timeframe paperwork needed: ASAP    Additional notes: THE PPI RATING WAS NOT SENT BACK W/ RESPONSE SENT ON 5.25.2022 BY [employer].

## 2022-06-08 ENCOUNTER — DOCUMENTATION (OUTPATIENT)
Dept: ORTHOPEDIC SURGERY | Facility: CLINIC | Age: 47
End: 2022-06-08

## 2022-06-08 NOTE — PROGRESS NOTES
I last saw Mr. Joshua for a Worker's Compensation injury on 4/8/2022.    Right elbow incision and healed.      He had 5 out of 5 strength and symmetric range of motion.      Flexion extension pronation supination equal symmetric to the contralateral side.    Strength was 5 out of 5 with flexion extension of the elbow      Impairment rating for the arm and whole body impairment rating based on his excellent recovery would be 0%.

## 2022-08-19 DIAGNOSIS — I10 UNCONTROLLED HYPERTENSION: ICD-10-CM

## 2022-08-19 RX ORDER — LISINOPRIL 20 MG/1
20 TABLET ORAL DAILY
Qty: 90 TABLET | Refills: 1 | OUTPATIENT
Start: 2022-08-19

## 2022-08-25 RX ORDER — ERGOCALCIFEROL 1.25 MG/1
CAPSULE ORAL
Qty: 5 CAPSULE | Refills: 4 | Status: SHIPPED | OUTPATIENT
Start: 2022-08-25 | End: 2022-11-01 | Stop reason: SDUPTHER

## 2022-09-28 DIAGNOSIS — J45.40 MODERATE PERSISTENT ASTHMA WITHOUT COMPLICATION: ICD-10-CM

## 2022-09-28 RX ORDER — LORATADINE 10 MG/1
TABLET ORAL
Qty: 90 TABLET | Refills: 3 | Status: SHIPPED | OUTPATIENT
Start: 2022-09-28

## 2022-10-07 DIAGNOSIS — M70.21 OLECRANON BURSITIS OF RIGHT ELBOW: ICD-10-CM

## 2022-10-07 DIAGNOSIS — M21.611 BUNION OF RIGHT FOOT: ICD-10-CM

## 2022-11-01 ENCOUNTER — OFFICE VISIT (OUTPATIENT)
Dept: INTERNAL MEDICINE | Facility: CLINIC | Age: 47
End: 2022-11-01

## 2022-11-01 ENCOUNTER — LAB (OUTPATIENT)
Dept: LAB | Facility: HOSPITAL | Age: 47
End: 2022-11-01

## 2022-11-01 VITALS
HEIGHT: 70 IN | HEART RATE: 74 BPM | WEIGHT: 211.8 LBS | DIASTOLIC BLOOD PRESSURE: 104 MMHG | BODY MASS INDEX: 30.32 KG/M2 | OXYGEN SATURATION: 98 % | TEMPERATURE: 97.5 F | SYSTOLIC BLOOD PRESSURE: 152 MMHG

## 2022-11-01 DIAGNOSIS — M79.641 BILATERAL HAND PAIN: ICD-10-CM

## 2022-11-01 DIAGNOSIS — E78.2 MIXED HYPERLIPIDEMIA: ICD-10-CM

## 2022-11-01 DIAGNOSIS — M21.611 BUNION OF RIGHT FOOT: ICD-10-CM

## 2022-11-01 DIAGNOSIS — I10 UNCONTROLLED HYPERTENSION: ICD-10-CM

## 2022-11-01 DIAGNOSIS — M11.20 CALCIUM PYROPHOSPHATE DEPOSITION DISEASE (CPPD): ICD-10-CM

## 2022-11-01 DIAGNOSIS — Z23 NEED FOR INFLUENZA VACCINATION: Primary | ICD-10-CM

## 2022-11-01 DIAGNOSIS — M70.21 OLECRANON BURSITIS OF RIGHT ELBOW: ICD-10-CM

## 2022-11-01 DIAGNOSIS — J45.40 MODERATE PERSISTENT ASTHMA WITHOUT COMPLICATION: ICD-10-CM

## 2022-11-01 DIAGNOSIS — M79.642 BILATERAL HAND PAIN: ICD-10-CM

## 2022-11-01 LAB
ALBUMIN SERPL-MCNC: 4.7 G/DL (ref 3.5–5.2)
ALBUMIN/GLOB SERPL: 1.4 G/DL
ALP SERPL-CCNC: 74 U/L (ref 39–117)
ALT SERPL W P-5'-P-CCNC: 32 U/L (ref 1–41)
ANION GAP SERPL CALCULATED.3IONS-SCNC: 10.8 MMOL/L (ref 5–15)
AST SERPL-CCNC: 33 U/L (ref 1–40)
BILIRUB SERPL-MCNC: 0.4 MG/DL (ref 0–1.2)
BUN SERPL-MCNC: 12 MG/DL (ref 6–20)
BUN/CREAT SERPL: 9.8 (ref 7–25)
CALCIUM SPEC-SCNC: 9.6 MG/DL (ref 8.6–10.5)
CHLORIDE SERPL-SCNC: 97 MMOL/L (ref 98–107)
CHOLEST SERPL-MCNC: 193 MG/DL (ref 0–200)
CHROMATIN AB SERPL-ACNC: <10 IU/ML (ref 0–14)
CO2 SERPL-SCNC: 32.2 MMOL/L (ref 22–29)
CREAT SERPL-MCNC: 1.22 MG/DL (ref 0.76–1.27)
EGFRCR SERPLBLD CKD-EPI 2021: 73.6 ML/MIN/1.73
ERYTHROCYTE [SEDIMENTATION RATE] IN BLOOD: 12 MM/HR (ref 0–15)
GLOBULIN UR ELPH-MCNC: 3.3 GM/DL
GLUCOSE SERPL-MCNC: 93 MG/DL (ref 65–99)
HDLC SERPL-MCNC: 60 MG/DL (ref 40–60)
LDLC SERPL CALC-MCNC: 118 MG/DL (ref 0–100)
LDLC/HDLC SERPL: 1.93 {RATIO}
POTASSIUM SERPL-SCNC: 3.2 MMOL/L (ref 3.5–5.2)
PROT SERPL-MCNC: 8 G/DL (ref 6–8.5)
SODIUM SERPL-SCNC: 140 MMOL/L (ref 136–145)
TRIGL SERPL-MCNC: 85 MG/DL (ref 0–150)
VLDLC SERPL-MCNC: 15 MG/DL (ref 5–40)

## 2022-11-01 PROCEDURE — 85652 RBC SED RATE AUTOMATED: CPT

## 2022-11-01 PROCEDURE — 99214 OFFICE O/P EST MOD 30 MIN: CPT | Performed by: INTERNAL MEDICINE

## 2022-11-01 PROCEDURE — 90686 IIV4 VACC NO PRSV 0.5 ML IM: CPT | Performed by: INTERNAL MEDICINE

## 2022-11-01 PROCEDURE — 80053 COMPREHEN METABOLIC PANEL: CPT

## 2022-11-01 PROCEDURE — 86431 RHEUMATOID FACTOR QUANT: CPT

## 2022-11-01 PROCEDURE — 80061 LIPID PANEL: CPT

## 2022-11-01 PROCEDURE — 90471 IMMUNIZATION ADMIN: CPT | Performed by: INTERNAL MEDICINE

## 2022-11-01 RX ORDER — ALBUTEROL SULFATE 90 UG/1
2 AEROSOL, METERED RESPIRATORY (INHALATION) EVERY 4 HOURS PRN
Qty: 18 G | Refills: 3 | Status: SHIPPED | OUTPATIENT
Start: 2022-11-01 | End: 2023-03-14 | Stop reason: SDUPTHER

## 2022-11-01 RX ORDER — LISINOPRIL 20 MG/1
20 TABLET ORAL DAILY
Qty: 90 TABLET | Refills: 1 | Status: SHIPPED | OUTPATIENT
Start: 2022-11-01 | End: 2023-03-14 | Stop reason: SDUPTHER

## 2022-11-01 RX ORDER — PREDNISONE 10 MG/1
TABLET ORAL
Qty: 18 TABLET | Refills: 0 | OUTPATIENT
Start: 2022-11-01 | End: 2022-12-16

## 2022-11-01 RX ORDER — ERGOCALCIFEROL 1.25 MG/1
50000 CAPSULE ORAL WEEKLY
Qty: 12 CAPSULE | Refills: 1 | Status: SHIPPED | OUTPATIENT
Start: 2022-11-01

## 2022-11-01 RX ORDER — BUDESONIDE AND FORMOTEROL FUMARATE DIHYDRATE 160; 4.5 UG/1; UG/1
2 AEROSOL RESPIRATORY (INHALATION) 2 TIMES DAILY
Qty: 10.2 G | Refills: 5 | Status: SHIPPED | OUTPATIENT
Start: 2022-11-01 | End: 2023-02-17 | Stop reason: SDUPTHER

## 2022-11-01 NOTE — PROGRESS NOTES
Hypertension (Has not had lisinopril for about 2 weeks) and Hand Pain (Worried about arthritis)    Subjective   cT Joshua is a 47 y.o. male is here today for follow-up.    History of Present Illness     He states he is doing well overall but has been without lisinopril for 2 weeks due to missing his last appointment. Patient also has not been taking his vitamin D supplement.    His elbow pain has improved following surgical intervention for bursitis in 11/2021. He has returned to work without issue and only experiences occasional soreness, which is resolved with the use of diclofenac cream. Patient has been released from the care of Dr. Reynaldo Loza.    At this time, he complains of bilateral pain and stiffness of the MCP joints of the long fingers. Pain occurs several days per week and is increased with bending of the finger backward. For this, he has been using Advil and diclofenac cream, which offers relief. He denies any recent significant injuries to the hand or knuckles but does note frequent minor injuries to the hand.     Mr. Joshua has received the influenza vaccine and has not received the new COVID-19 vaccine.         S/p right elbow surgery - triceps repair and olecranon bursectomy and bursal excision, doing much better    Current Outpatient Medications:   •  albuterol sulfate  (90 Base) MCG/ACT inhaler, Inhale 2 puffs Every 4 (Four) Hours As Needed for Wheezing., Disp: 18 g, Rfl: 3  •  atorvastatin (LIPITOR) 10 MG tablet, Take 1 tablet by mouth Daily., Disp: 90 tablet, Rfl: 3  •  budesonide-formoterol (SYMBICORT) 160-4.5 MCG/ACT inhaler, Inhale 2 puffs 2 (Two) Times a Day., Disp: 10.2 g, Rfl: 5  •  chlorthalidone (HYGROTON) 25 MG tablet, Take 1 tablet by mouth Daily., Disp: 90 tablet, Rfl: 3  •  Diclofenac Sodium (VOLTAREN) 1 % gel gel, Apply  topically to the appropriate area as directed 3 (Three) Times a Day., Disp: 100 g, Rfl: 5  •  lisinopril (PRINIVIL,ZESTRIL) 20 MG tablet, Take 1 tablet  "by mouth Daily., Disp: 90 tablet, Rfl: 1  •  loratadine (CLARITIN) 10 MG tablet, TAKE 1 TABLET BY MOUTH EVERY DAY, Disp: 90 tablet, Rfl: 3  •  montelukast (SINGULAIR) 10 MG tablet, Take 1 tablet by mouth Every Evening., Disp: 90 tablet, Rfl: 3  •  potassium chloride 10 MEQ CR tablet, Take 1 tablet by mouth Daily., Disp: 180 tablet, Rfl: 3  •  vitamin D (ERGOCALCIFEROL) 1.25 MG (23480 UT) capsule capsule, Take 1 capsule by mouth 1 (One) Time Per Week., Disp: 12 capsule, Rfl: 1  •  predniSONE (DELTASONE) 10 MG tablet, Take 3 tabs daily x 3 days then 2 tabs daily x 3 days then 1 tab daily x 3 days then stop., Disp: 18 tablet, Rfl: 0      The following portions of the patient's history were reviewed and updated as appropriate: allergies, current medications, past family history, past medical history, past social history, past surgical history and problem list.    Review of Systems   Constitutional: Negative.  Negative for chills and fever.   HENT: Negative for ear discharge, ear pain, sinus pressure and sore throat.    Respiratory: Negative for cough, chest tightness and shortness of breath.    Cardiovascular: Negative for chest pain, palpitations and leg swelling.   Gastrointestinal: Negative for diarrhea, nausea and vomiting.   Musculoskeletal: Positive for arthralgias. Negative for back pain and myalgias.   Neurological: Negative for dizziness, syncope and headaches.   Psychiatric/Behavioral: Negative for confusion and sleep disturbance.       Objective   BP (!) 152/104   Pulse 74   Temp 97.5 °F (36.4 °C)   Ht 177.8 cm (70\")   Wt 96.1 kg (211 lb 12.8 oz)   SpO2 98% Comment: ra  BMI 30.39 kg/m²   Physical Exam  Vitals and nursing note reviewed.   Constitutional:       Appearance: He is well-developed.   HENT:      Head: Normocephalic and atraumatic.      Right Ear: External ear normal.      Left Ear: External ear normal.      Mouth/Throat:      Pharynx: No oropharyngeal exudate.   Eyes:      Conjunctiva/sclera: " Conjunctivae normal.      Pupils: Pupils are equal, round, and reactive to light.   Neck:      Thyroid: No thyromegaly.   Cardiovascular:      Rate and Rhythm: Normal rate and regular rhythm.      Pulses: Normal pulses.      Heart sounds: Normal heart sounds. No murmur heard.    No friction rub. No gallop.   Pulmonary:      Effort: Pulmonary effort is normal.      Breath sounds: Normal breath sounds.   Abdominal:      General: Bowel sounds are normal. There is no distension.      Palpations: Abdomen is soft.      Tenderness: There is no abdominal tenderness.   Musculoskeletal:         General: Swelling and tenderness (b/l middle MP joints) present.      Cervical back: Neck supple.   Skin:     General: Skin is warm and dry.   Neurological:      Mental Status: He is alert and oriented to person, place, and time.      Cranial Nerves: No cranial nerve deficit.   Psychiatric:         Judgment: Judgment normal.           Results for orders placed or performed in visit on 03/08/22   CBC (No Diff)    Specimen: Blood   Result Value Ref Range    WBC 4.60 3.40 - 10.80 10*3/mm3    RBC 5.55 4.14 - 5.80 10*6/mm3    Hemoglobin 16.0 13.0 - 17.7 g/dL    Hematocrit 47.1 37.5 - 51.0 %    MCV 84.9 79.0 - 97.0 fL    MCH 28.8 26.6 - 33.0 pg    MCHC 34.0 31.5 - 35.7 g/dL    RDW 13.6 12.3 - 15.4 %    RDW-SD 41.8 37.0 - 54.0 fl    MPV 10.6 6.0 - 12.0 fL    Platelets 232 140 - 450 10*3/mm3   Comprehensive Metabolic Panel    Specimen: Blood   Result Value Ref Range    Glucose 96 65 - 99 mg/dL    BUN 16 6 - 20 mg/dL    Creatinine 1.20 0.76 - 1.27 mg/dL    Sodium 141 136 - 145 mmol/L    Potassium 3.7 3.5 - 5.2 mmol/L    Chloride 98 98 - 107 mmol/L    CO2 31.4 (H) 22.0 - 29.0 mmol/L    Calcium 10.2 8.6 - 10.5 mg/dL    Total Protein 8.3 6.0 - 8.5 g/dL    Albumin 5.00 3.50 - 5.20 g/dL    ALT (SGPT) 43 (H) 1 - 41 U/L    AST (SGOT) 41 (H) 1 - 40 U/L    Alkaline Phosphatase 77 39 - 117 U/L    Total Bilirubin 0.4 0.0 - 1.2 mg/dL    Globulin 3.3 gm/dL     A/G Ratio 1.5 g/dL    BUN/Creatinine Ratio 13.3 7.0 - 25.0    Anion Gap 11.6 5.0 - 15.0 mmol/L    eGFR 75.5 >60.0 mL/min/1.73   Lipid Panel    Specimen: Blood   Result Value Ref Range    Total Cholesterol 198 0 - 200 mg/dL    Triglycerides 69 0 - 150 mg/dL    HDL Cholesterol 46 40 - 60 mg/dL    LDL Cholesterol  139 (H) 0 - 100 mg/dL    VLDL Cholesterol 13 5 - 40 mg/dL    LDL/HDL Ratio 3.00    TSH    Specimen: Blood   Result Value Ref Range    TSH 0.472 0.270 - 4.200 uIU/mL   Vitamin D 25 Hydroxy    Specimen: Blood   Result Value Ref Range    25 Hydroxy, Vitamin D 19.6 (L) 30.0 - 100.0 ng/ml   Vitamin B12    Specimen: Blood   Result Value Ref Range    Vitamin B-12 736 211 - 946 pg/mL             Assessment & Plan   Diagnoses and all orders for this visit:    Need for influenza vaccination  -     FluLaval/Fluarix/Fluzone >6 Months    Moderate persistent asthma without complication  -     albuterol sulfate  (90 Base) MCG/ACT inhaler; Inhale 2 puffs Every 4 (Four) Hours As Needed for Wheezing.  -     budesonide-formoterol (SYMBICORT) 160-4.5 MCG/ACT inhaler; Inhale 2 puffs 2 (Two) Times a Day.  -     Continue current regimen as prescribed.    Olecranon bursitis of right elbow  -     Diclofenac Sodium (VOLTAREN) 1 % gel gel; Apply  topically to the appropriate area as directed 3 (Three) Times a Day.  -     Continue voltaren gel as needed for pain.    Bunion of right foot  -     Diclofenac Sodium (VOLTAREN) 1 % gel gel; Apply  topically to the appropriate area as directed 3 (Three) Times a Day.        -     Continue voltaren gel as needed for pain.    Uncontrolled hypertension  -     lisinopril (PRINIVIL,ZESTRIL) 20 MG tablet; Take 1 tablet by mouth Daily.  -     Resume lisinopril as prescribed.  -     Discussed the risks of uncontrolled hypertension.  -     Advised to avoid going without lisinopril in the future.  -     Continue to monitor.    Bilateral hand pain  -     XR Hand 3+ View Bilateral; Future  -      Rheumatoid Factor; Future  -     Sedimentation Rate; Future  -     predniSONE (DELTASONE) 10 MG tablet; Take 3 tabs daily x 3 days then 2 tabs daily x 3 days then 1 tab daily x 3 days then stop.        -     Continue voltaren gel as needed for pain.        -     Continue Advil as needed for pain.    Mixed hyperlipidemia  -     Comprehensive Metabolic Panel; Future  -     Lipid Panel; Future    Other orders  -     vitamin D (ERGOCALCIFEROL) 1.25 MG (54090 UT) capsule capsule; Take 1 capsule by mouth 1 (One) Time Per Week.  -     Resume vitamin D supplement as prescribed.                 Return in about 6 months (around 5/1/2023).    Electronically signed by:    Alexandrea Hanna MD     Transcribed from ambient dictation for Alexandrea Hanna MD by Geri Calhoun.  11/01/22   13:26 EDT    Patient or patient representative verbalized consent to the visit recording.

## 2022-11-03 ENCOUNTER — HOSPITAL ENCOUNTER (OUTPATIENT)
Dept: GENERAL RADIOLOGY | Facility: HOSPITAL | Age: 47
Discharge: HOME | End: 2022-11-03
Admitting: INTERNAL MEDICINE

## 2022-11-03 DIAGNOSIS — M79.642 BILATERAL HAND PAIN: ICD-10-CM

## 2022-11-03 DIAGNOSIS — M79.641 BILATERAL HAND PAIN: ICD-10-CM

## 2022-11-03 PROCEDURE — 73130 X-RAY EXAM OF HAND: CPT

## 2022-11-13 DIAGNOSIS — M11.20 CALCIUM PYROPHOSPHATE DEPOSITION DISEASE (CPPD): Primary | ICD-10-CM

## 2022-11-13 RX ORDER — COLCHICINE 0.6 MG/1
TABLET ORAL
Qty: 30 TABLET | Refills: 0 | Status: SHIPPED | OUTPATIENT
Start: 2022-11-13 | End: 2022-12-02 | Stop reason: SDUPTHER

## 2022-11-15 ENCOUNTER — PRIOR AUTHORIZATION (OUTPATIENT)
Dept: INTERNAL MEDICINE | Facility: CLINIC | Age: 47
End: 2022-11-15

## 2022-11-28 DIAGNOSIS — E87.6 HYPOKALEMIA: ICD-10-CM

## 2022-11-28 RX ORDER — POTASSIUM CHLORIDE 750 MG/1
TABLET, FILM COATED, EXTENDED RELEASE ORAL
Qty: 180 TABLET | Refills: 3 | OUTPATIENT
Start: 2022-11-28

## 2022-12-02 DIAGNOSIS — E87.6 HYPOKALEMIA: ICD-10-CM

## 2022-12-02 DIAGNOSIS — M11.20 CALCIUM PYROPHOSPHATE DEPOSITION DISEASE (CPPD): ICD-10-CM

## 2022-12-02 RX ORDER — POTASSIUM CHLORIDE 750 MG/1
10 TABLET, FILM COATED, EXTENDED RELEASE ORAL DAILY
Qty: 180 TABLET | Refills: 3 | OUTPATIENT
Start: 2022-12-02

## 2022-12-02 RX ORDER — COLCHICINE 0.6 MG/1
TABLET ORAL
Qty: 30 TABLET | Refills: 0 | Status: SHIPPED | OUTPATIENT
Start: 2022-12-02 | End: 2023-03-14 | Stop reason: SDUPTHER

## 2022-12-02 NOTE — TELEPHONE ENCOUNTER
Caller: [id number] - [place], [place] - [address] [facility] [address] - [phone] [facility] [phone] FX    Relationship: Pharmacy    Best call back number: [phone]    Requested Prescriptions:   Requested Prescriptions     Pending Prescriptions Disp Refills   • potassium chloride 10 MEQ CR tablet 180 tablet 3     Sig: Take 1 tablet by mouth Daily.        Pharmacy where request should be sent: [id number] [place], [place] - [address] [facility] [address] - [phone]  - [phone] FX     Additional details provided by patient: PHARMACY SAYS THE PATIENT'S DOSE HAS BEEN INCREASED TO 2 PER DAY. THEY WOULD LIKE A NEW PRESCRIPTION SENT WITH THE NEW DOSE.    Does the patient have less than a 3 day supply:  [x] Yes  [] No    Would you like a call back once the refill request has been completed: [] Yes [x] No    If the office needs to give you a call back, can they leave a voicemail: [] Yes [x] No    Nazia Grewal Rep   12/02/22 10:32 EST

## 2022-12-02 NOTE — TELEPHONE ENCOUNTER
Caller: Tc Joshua    Relationship: Self    Best call back number: [phone]    Requested Prescriptions:   Requested Prescriptions     Pending Prescriptions Disp Refills   • colchicine 0.6 MG tablet 30 tablet 0     Si PO BID prn joint pain. Hold for diarrhea        Pharmacy where request should be sent: [id number] [address] AT [facility] [address] [facility] [address] - [phone]  - [phone] FX     Additional details provided by patient: PATIENT WAS GIVEN THIS MEDICATION FOR 30 DAYS. HE IS LOW AND IF HE IS SUPPOSED TO CONTINUE TAKING IT HIS PHARMACY WILL NEED A REQUEST.    HE ALSO NEEDS TO KNOW IF HE IS SUPPOSED TO START TAKING atorvastatin (LIPITOR) 10 MG tablet AGAIN. PLEASE ADVISE AND CALL PATIENT TO DISCUSS      Does the patient have less than a 3 day supply:  [] Yes  [x] No    Would you like a call back once the refill request has been completed: [x] Yes [] No    If the office needs to give you a call back, can they leave a voicemail: [x] Yes [] No    Nazia Healy Rep   22 12:20 EST

## 2022-12-03 RX ORDER — POTASSIUM CHLORIDE 750 MG/1
10 TABLET, FILM COATED, EXTENDED RELEASE ORAL 2 TIMES DAILY
Qty: 180 TABLET | Refills: 3 | Status: SHIPPED | OUTPATIENT
Start: 2022-12-03

## 2022-12-21 ENCOUNTER — TELEPHONE (OUTPATIENT)
Dept: INTERNAL MEDICINE | Facility: CLINIC | Age: 47
End: 2022-12-21

## 2023-02-11 DIAGNOSIS — J45.40 MODERATE PERSISTENT ASTHMA WITHOUT COMPLICATION: ICD-10-CM

## 2023-02-12 RX ORDER — BUDESONIDE AND FORMOTEROL FUMARATE DIHYDRATE 160; 4.5 UG/1; UG/1
AEROSOL RESPIRATORY (INHALATION)
Qty: 10.2 G | Refills: 5 | OUTPATIENT
Start: 2023-02-12

## 2023-02-17 DIAGNOSIS — J45.40 MODERATE PERSISTENT ASTHMA WITHOUT COMPLICATION: ICD-10-CM

## 2023-02-17 RX ORDER — BUDESONIDE AND FORMOTEROL FUMARATE DIHYDRATE 160; 4.5 UG/1; UG/1
2 AEROSOL RESPIRATORY (INHALATION) 2 TIMES DAILY
Qty: 10.2 G | Refills: 5 | Status: SHIPPED | OUTPATIENT
Start: 2023-02-17

## 2023-02-17 NOTE — TELEPHONE ENCOUNTER
Caller: Tc Joshua    Relationship: Self    Best call back number: [phone]    Requested Prescriptions:   Requested Prescriptions     Pending Prescriptions Disp Refills   • budesonide-formoterol (SYMBICORT) 160-4.5 MCG/ACT inhaler 10.2 g 5     Sig: Inhale 2 puffs 2 (Two) Times a Day.      Pharmacy where request should be sent: [facility] #[id number] [facility] [address] AT [facility] [phone] [facility] [phone] FX     Does the patient have less than a 3 day supply:  [] Yes  [x] No    Would you like a call back once the refill request has been completed: [x] Yes [] No    If the office needs to give you a call back, can they leave a voicemail: [x] Yes [] No    Nazia Thornton Rep   02/17/23 08:42 EST

## 2023-03-14 ENCOUNTER — OFFICE VISIT (OUTPATIENT)
Dept: INTERNAL MEDICINE | Facility: CLINIC | Age: 48
End: 2023-03-14
Payer: COMMERCIAL

## 2023-03-14 ENCOUNTER — LAB (OUTPATIENT)
Dept: LAB | Facility: HOSPITAL | Age: 48
End: 2023-03-14
Payer: COMMERCIAL

## 2023-03-14 VITALS
BODY MASS INDEX: 30.55 KG/M2 | HEART RATE: 78 BPM | HEIGHT: 70 IN | DIASTOLIC BLOOD PRESSURE: 80 MMHG | WEIGHT: 213.4 LBS | TEMPERATURE: 97.6 F | OXYGEN SATURATION: 97 % | SYSTOLIC BLOOD PRESSURE: 142 MMHG

## 2023-03-14 DIAGNOSIS — M70.21 OLECRANON BURSITIS OF RIGHT ELBOW: ICD-10-CM

## 2023-03-14 DIAGNOSIS — Z12.5 PROSTATE CANCER SCREENING: ICD-10-CM

## 2023-03-14 DIAGNOSIS — E55.9 VITAMIN D DEFICIENCY: ICD-10-CM

## 2023-03-14 DIAGNOSIS — M11.20 CALCIUM PYROPHOSPHATE DEPOSITION DISEASE (CPPD): ICD-10-CM

## 2023-03-14 DIAGNOSIS — E78.2 MIXED HYPERLIPIDEMIA: ICD-10-CM

## 2023-03-14 DIAGNOSIS — K08.89 PAIN, DENTAL: ICD-10-CM

## 2023-03-14 DIAGNOSIS — M21.611 BUNION OF RIGHT FOOT: ICD-10-CM

## 2023-03-14 DIAGNOSIS — Z00.00 ROUTINE GENERAL MEDICAL EXAMINATION AT A HEALTH CARE FACILITY: ICD-10-CM

## 2023-03-14 DIAGNOSIS — Z00.00 ROUTINE GENERAL MEDICAL EXAMINATION AT A HEALTH CARE FACILITY: Primary | ICD-10-CM

## 2023-03-14 DIAGNOSIS — I10 UNCONTROLLED HYPERTENSION: ICD-10-CM

## 2023-03-14 DIAGNOSIS — J45.40 MODERATE PERSISTENT ASTHMA WITHOUT COMPLICATION: ICD-10-CM

## 2023-03-14 LAB
25(OH)D3 SERPL-MCNC: 54.7 NG/ML (ref 30–100)
ALBUMIN SERPL-MCNC: 4.9 G/DL (ref 3.5–5.2)
ALBUMIN/GLOB SERPL: 1.5 G/DL
ALP SERPL-CCNC: 72 U/L (ref 39–117)
ALT SERPL W P-5'-P-CCNC: 44 U/L (ref 1–41)
ANION GAP SERPL CALCULATED.3IONS-SCNC: 9.3 MMOL/L (ref 5–15)
AST SERPL-CCNC: 37 U/L (ref 1–40)
BILIRUB BLD-MCNC: NEGATIVE MG/DL
BILIRUB SERPL-MCNC: 0.3 MG/DL (ref 0–1.2)
BUN SERPL-MCNC: 17 MG/DL (ref 6–20)
BUN/CREAT SERPL: 14.4 (ref 7–25)
CALCIUM SPEC-SCNC: 10.4 MG/DL (ref 8.6–10.5)
CHLORIDE SERPL-SCNC: 98 MMOL/L (ref 98–107)
CHOLEST SERPL-MCNC: 200 MG/DL (ref 0–200)
CLARITY, POC: CLEAR
CO2 SERPL-SCNC: 29.7 MMOL/L (ref 22–29)
COLOR UR: YELLOW
CREAT SERPL-MCNC: 1.18 MG/DL (ref 0.76–1.27)
DEPRECATED RDW RBC AUTO: 40.7 FL (ref 37–54)
EGFRCR SERPLBLD CKD-EPI 2021: 76.6 ML/MIN/1.73
ERYTHROCYTE [DISTWIDTH] IN BLOOD BY AUTOMATED COUNT: 13.6 % (ref 12.3–15.4)
EXPIRATION DATE: NORMAL
GLOBULIN UR ELPH-MCNC: 3.2 GM/DL
GLUCOSE SERPL-MCNC: 94 MG/DL (ref 65–99)
GLUCOSE UR STRIP-MCNC: NEGATIVE MG/DL
HBA1C MFR BLD: 5.7 % (ref 4.8–5.6)
HCT VFR BLD AUTO: 43.6 % (ref 37.5–51)
HDLC SERPL-MCNC: 52 MG/DL (ref 40–60)
HGB BLD-MCNC: 15.1 G/DL (ref 13–17.7)
KETONES UR QL: NEGATIVE
LDLC SERPL CALC-MCNC: 129 MG/DL (ref 0–100)
LDLC/HDLC SERPL: 2.45 {RATIO}
LEUKOCYTE EST, POC: NEGATIVE
Lab: NORMAL
MCH RBC QN AUTO: 29 PG (ref 26.6–33)
MCHC RBC AUTO-ENTMCNC: 34.6 G/DL (ref 31.5–35.7)
MCV RBC AUTO: 83.8 FL (ref 79–97)
NITRITE UR-MCNC: NEGATIVE MG/ML
PH UR: 7.5 [PH] (ref 5–8)
PLATELET # BLD AUTO: 236 10*3/MM3 (ref 140–450)
PMV BLD AUTO: 10.5 FL (ref 6–12)
POTASSIUM SERPL-SCNC: 4 MMOL/L (ref 3.5–5.2)
PROT SERPL-MCNC: 8.1 G/DL (ref 6–8.5)
PROT UR STRIP-MCNC: NEGATIVE MG/DL
PSA SERPL-MCNC: 0.77 NG/ML (ref 0–4)
RBC # BLD AUTO: 5.2 10*6/MM3 (ref 4.14–5.8)
RBC # UR STRIP: NEGATIVE /UL
SODIUM SERPL-SCNC: 137 MMOL/L (ref 136–145)
SP GR UR: 1.02 (ref 1–1.03)
TRIGL SERPL-MCNC: 104 MG/DL (ref 0–150)
TSH SERPL DL<=0.05 MIU/L-ACNC: 0.56 UIU/ML (ref 0.27–4.2)
URATE SERPL-MCNC: 5.9 MG/DL (ref 3.4–7)
UROBILINOGEN UR QL: NORMAL
VLDLC SERPL-MCNC: 19 MG/DL (ref 5–40)
WBC NRBC COR # BLD: 4.87 10*3/MM3 (ref 3.4–10.8)

## 2023-03-14 PROCEDURE — 84550 ASSAY OF BLOOD/URIC ACID: CPT

## 2023-03-14 PROCEDURE — 99396 PREV VISIT EST AGE 40-64: CPT | Performed by: INTERNAL MEDICINE

## 2023-03-14 PROCEDURE — 82306 VITAMIN D 25 HYDROXY: CPT

## 2023-03-14 PROCEDURE — 83036 HEMOGLOBIN GLYCOSYLATED A1C: CPT

## 2023-03-14 PROCEDURE — 80061 LIPID PANEL: CPT

## 2023-03-14 PROCEDURE — 3079F DIAST BP 80-89 MM HG: CPT | Performed by: INTERNAL MEDICINE

## 2023-03-14 PROCEDURE — G0103 PSA SCREENING: HCPCS

## 2023-03-14 PROCEDURE — 80050 GENERAL HEALTH PANEL: CPT

## 2023-03-14 PROCEDURE — 3077F SYST BP >= 140 MM HG: CPT | Performed by: INTERNAL MEDICINE

## 2023-03-14 RX ORDER — LISINOPRIL 30 MG/1
30 TABLET ORAL DAILY
Qty: 90 TABLET | Refills: 3 | Status: SHIPPED | OUTPATIENT
Start: 2023-03-14

## 2023-03-14 RX ORDER — ALBUTEROL SULFATE 90 UG/1
2 AEROSOL, METERED RESPIRATORY (INHALATION) EVERY 4 HOURS PRN
Qty: 18 G | Refills: 3 | Status: SHIPPED | OUTPATIENT
Start: 2023-03-14

## 2023-03-14 RX ORDER — MONTELUKAST SODIUM 10 MG/1
10 TABLET ORAL EVERY EVENING
Qty: 90 TABLET | Refills: 3 | Status: SHIPPED | OUTPATIENT
Start: 2023-03-14

## 2023-03-14 RX ORDER — COLCHICINE 0.6 MG/1
TABLET ORAL
Qty: 30 TABLET | Refills: 3 | Status: SHIPPED | OUTPATIENT
Start: 2023-03-14

## 2023-03-14 RX ORDER — CHLORTHALIDONE 25 MG/1
25 TABLET ORAL DAILY
Qty: 90 TABLET | Refills: 3 | Status: SHIPPED | OUTPATIENT
Start: 2023-03-14

## 2023-03-14 RX ORDER — ATORVASTATIN CALCIUM 10 MG/1
10 TABLET, FILM COATED ORAL DAILY
Qty: 90 TABLET | Refills: 3 | Status: SHIPPED | OUTPATIENT
Start: 2023-03-14

## 2023-03-14 NOTE — PROGRESS NOTES
Chief Complaint   Patient presents with   • Annual Exam   • Dental Pain   • Joint Pain       History of Present Illness  HM, Adult Male: Illness  HM, Adult Male:  The patient is being seen for a health maintenance evaluation. The last health maintenance visit was 1 year(s) ago.   Social History: Household members include spouse, . He is  with 3 children, twins are 20 and yougest dtr is 15- Sophomore. Work status: working full time, now in [facility]. The patient has never smoked cigarettes. He reports never drinking alcohol. He has never used illicit drugs.   General Health: The patient's health is described as good. He has regular dental visits. He denies vision problems. He denies hearing loss. Immunizations status: up to date.   Lifestyle:. He consumes a diverse and healthy diet. He does not have any weight concerns. He exercises regularly, but has cut down going to the gym. He does not use tobacco. He denies alcohol use. He denies drug use.   Screening: Cancer screening reviewed and current.   Metabolic screening reviewed and current.   Risk screening reviewed and current.   Elbow is continuing to do better. voltaren gel.    HPI  Rt lower molar tooth pain. Unable to get into his dentist for another month.  B/l hand pain,     The patient presents today for a physical.    His blood pressure is better today. He is taking lisinopril 20 mg and chlorthalidone 25 mg.    He feels like he has an abscess in his teeth. He went to the dentist 1 month ago, however, it started bothering him. It is behind where he got a crown. He can feel his tongue that is inflamed.    His elbow is doing pretty good. Sometimes it feels like it is some nerve type pain. He uses diclofenac cream and it seems to work pretty well. He is to the point where he can exercise with weights and it is not hurting him.    He denies any joint pain. He took steroids for 30 days. He still has some pain. Yesterday, 03/13/2023, when it was cold, he was  trying to work and he could feel it stiffening up, however, it has not gotten close to where it was before. He was referred to a rheumatologist, however, they would not see him for this diagnosis because they do not treat CPPD. He took colchicine for 15 days.    He denies any headaches, dizzy spells, chest pain, shortness of breath, cough, urinary problems, numbness, tingling, rash, or moles or changing.    He is up to date on his colonoscopy. They found several polyps.    He denies a family history of diabetes.    Review of Systems   Constitutional: Negative for chills and fatigue.   HENT: Positive for dental problem. Negative for congestion, ear pain and sore throat.    Eyes: Negative for pain, redness and visual disturbance.   Respiratory: Negative for cough and shortness of breath.    Cardiovascular: Negative for chest pain, palpitations and leg swelling.   Gastrointestinal: Negative for abdominal pain, diarrhea and nausea.   Endocrine: Negative for cold intolerance and heat intolerance.   Genitourinary: Negative for flank pain and urgency.   Musculoskeletal: Positive for arthralgias. Negative for gait problem.   Skin: Negative for pallor and rash.   Neurological: Negative for dizziness, weakness and headaches.   Psychiatric/Behavioral: Negative for dysphoric mood and sleep disturbance. The patient is not nervous/anxious.        Patient Active Problem List   Diagnosis   • Allergic rhinitis   • Asthma   • Essential hypertension   • Mixed hyperlipidemia   • Hypokalemia   • Olecranon bursitis of right elbow   • Triceps strain, right, initial encounter   • Osteophyte of right elbow   • Rupture of right triceps tendon   • Calcium pyrophosphate deposition disease (CPPD)       Social History     Socioeconomic History   • Marital status:    Tobacco Use   • Smoking status: Former     Types: Cigarettes   • Smokeless tobacco: Never   Vaping Use   • Vaping Use: Never used   Substance and Sexual Activity   • Alcohol  "use: Yes     Alcohol/week: 3.0 standard drinks     Types: 3 Cans of beer per week   • Drug use: No   • Sexual activity: Defer       Current Outpatient Medications on File Prior to Visit   Medication Sig Dispense Refill   • budesonide-formoterol (SYMBICORT) 160-4.5 MCG/ACT inhaler Inhale 2 puffs 2 (Two) Times a Day. 10.2 g 5   • loratadine (CLARITIN) 10 MG tablet TAKE 1 TABLET BY MOUTH EVERY DAY 90 tablet 3   • potassium chloride 10 MEQ CR tablet Take 1 tablet by mouth 2 (Two) Times a Day. 180 tablet 3   • vitamin D (ERGOCALCIFEROL) 1.25 MG (24356 UT) capsule capsule Take 1 capsule by mouth 1 (One) Time Per Week. 12 capsule 1     No current facility-administered medications on file prior to visit.       No Known Allergies    /80   Pulse 78   Temp 97.6 °F (36.4 °C)   Ht 177.8 cm (70\")   Wt 96.8 kg (213 lb 6.4 oz)   SpO2 97% Comment: ra  BMI 30.62 kg/m²          The following portions of the patient's history were reviewed and updated as appropriate: allergies, current medications, past family history, past medical history, past social history, past surgical history and problem list.    Physical Exam  Constitutional:       General: He is not in acute distress.     Appearance: Normal appearance.   HENT:      Head: Normocephalic and atraumatic.      Right Ear: Tympanic membrane and external ear normal.      Left Ear: Tympanic membrane and external ear normal.      Nose: Nose normal.      Mouth/Throat:      Mouth: Mucous membranes are moist.      Comments: Tender over rt 2nd lower molar  Eyes:      General: No scleral icterus.  Neck:      Vascular: No carotid bruit.   Cardiovascular:      Rate and Rhythm: Normal rate and regular rhythm.      Pulses: Normal pulses.      Heart sounds: Normal heart sounds. No murmur heard.    No friction rub. No gallop.   Pulmonary:      Effort: Pulmonary effort is normal.      Breath sounds: Normal breath sounds. No rhonchi or rales.   Abdominal:      General: Bowel sounds are " normal. There is no distension.      Palpations: Abdomen is soft.      Tenderness: There is no right CVA tenderness, left CVA tenderness, guarding or rebound.      Hernia: No hernia is present.   Musculoskeletal:         General: Tenderness and deformity (rt hand cmc joints) present. Normal range of motion.      Cervical back: Normal range of motion.      Right lower leg: No edema.      Left lower leg: No edema.   Lymphadenopathy:      Cervical: No cervical adenopathy.   Skin:     General: Skin is warm.      Findings: No rash.   Neurological:      General: No focal deficit present.      Mental Status: He is alert and oriented to person, place, and time. Mental status is at baseline.      Cranial Nerves: No cranial nerve deficit.      Sensory: No sensory deficit.      Coordination: Coordination normal.      Gait: Gait normal.      Deep Tendon Reflexes: Reflexes normal.   Psychiatric:         Mood and Affect: Mood normal.         Behavior: Behavior normal.         Results for orders placed or performed in visit on 03/14/23   POCT urinalysis dipstick, automated    Specimen: Urine   Result Value Ref Range    Color Yellow Yellow, Straw, Dark Yellow, Hanna    Clarity, UA Clear Clear    Specific Gravity  1.020 1.005 - 1.030    pH, Urine 7.5 5.0 - 8.0    Leukocytes Negative Negative    Nitrite, UA Negative Negative    Protein, POC Negative Negative mg/dL    Glucose, UA Negative Negative mg/dL    Ketones, UA Negative Negative    Urobilinogen, UA Normal Normal, 0.2 E.U./dL    Bilirubin Negative Negative    Blood, UA Negative Negative    Lot Number [id number]     Expiration Date 7/13/24        Diagnoses and all orders for this visit:    1. Routine general medical examination at a health care facility (Primary)  -     POCT urinalysis dipstick, automated  -     CBC (No Diff); Future  -     Comprehensive Metabolic Panel; Future  -     Lipid Panel; Future  -     Hemoglobin A1c; Future  -     TSH; Future    2. Moderate  persistent asthma without complication  -     albuterol sulfate  (90 Base) MCG/ACT inhaler; Inhale 2 puffs Every 4 (Four) Hours As Needed for Wheezing.  Dispense: 18 g; Refill: 3  -     montelukast (SINGULAIR) 10 MG tablet; Take 1 tablet by mouth Every Evening.  Dispense: 90 tablet; Refill: 3    3. Mixed hyperlipidemia  -     atorvastatin (LIPITOR) 10 MG tablet; Take 1 tablet by mouth Daily.  Dispense: 90 tablet; Refill: 3  -     Lipid Panel; Future    4. Uncontrolled hypertension  -     chlorthalidone (HYGROTON) 25 MG tablet; Take 1 tablet by mouth Daily.  Dispense: 90 tablet; Refill: 3  -     lisinopril (PRINIVIL,ZESTRIL) 30 MG tablet; Take 1 tablet by mouth Daily.  Dispense: 90 tablet; Refill: 3    5. Olecranon bursitis of right elbow  -     Diclofenac Sodium (VOLTAREN) 1 % gel gel; Apply  topically to the appropriate area as directed 3 (Three) Times a Day.  Dispense: 100 g; Refill: 5    6. Bunion of right foot  -     Diclofenac Sodium (VOLTAREN) 1 % gel gel; Apply  topically to the appropriate area as directed 3 (Three) Times a Day.  Dispense: 100 g; Refill: 5    7. Calcium pyrophosphate deposition disease (CPPD)  -     colchicine 0.6 MG tablet; 1 PO BID prn joint pain. Hold for diarrhea  Dispense: 30 tablet; Refill: 3  -     Uric Acid; Future  -     Ambulatory Referral to Rheumatology    8. Prostate cancer screening  -     PSA Screen; Future    9. Vitamin D deficiency  -     Vitamin D,25-Hydroxy; Future    10. Pain, dental  -     amoxicillin (AMOXIL) 875 MG tablet; Take 1 tablet by mouth 2 (Two) Times a Day. For infection  Dispense: 20 tablet; Refill: 0        Health Maintenance   Topic Date Due   • COVID-19 Vaccine (3 - Booster for Pfizer series) 03/16/2023 (Originally 6/12/2021)   • Pneumococcal Vaccine 0-64 (1 - PCV) 03/14/2024 (Originally 4/23/1981)   • ANNUAL PHYSICAL  03/14/2024   • LIPID PANEL  03/14/2024   • COLORECTAL CANCER SCREENING  04/09/2024   • TDAP/TD VACCINES (3 - Td or Tdap) 06/28/2032    • HEPATITIS C SCREENING  Completed   • INFLUENZA VACCINE  Completed       Discussion/Summary  Impression: health maintenance visit, healthy adult male.   Currently, he eats a healthy diet and has an adequate exercise regimen.   Prostate cancer screening: PSA was ordered.   Testicular cancer screening: monthly self testicular exam was advised.   Colorectal cancer screening: fecal occult blood testing is needed every year and colonoscopy is utd.   CT low dose screen- not indicated  Screening lab work includes glucose, lipid profile and 25-hydroxyvitamin D.   The immunizations are up to date.   Advice and education were given regarding cardiovascular risk reduction, healthy dietary habits, Seatbelt and helmet use and self skin examination.        Return in about 6 months (around 9/14/2023) for Next scheduled follow up.    Electronically signed by:    Alexandrea Hanna MD     Transcribed from ambient dictation for Alexandrea Hanna MD by [employer] .  03/14/23   12:44 EDT    Patient or patient representative verbalized consent to the visit recording.  I have personally performed the services described in this document as transcribed by the above individual, and it is both accurate and complete.  Alexandrea Hanna MD  3/16/2023  00:16 EDT

## 2023-03-16 RX ORDER — AMOXICILLIN 875 MG/1
875 TABLET, COATED ORAL 2 TIMES DAILY
Qty: 20 TABLET | Refills: 0 | Status: SHIPPED | OUTPATIENT
Start: 2023-03-16

## 2023-05-10 DIAGNOSIS — E87.6 HYPOKALEMIA: ICD-10-CM

## 2023-05-10 RX ORDER — POTASSIUM CHLORIDE 750 MG/1
TABLET, FILM COATED, EXTENDED RELEASE ORAL
Qty: 180 TABLET | Refills: 3 | OUTPATIENT
Start: 2023-05-10

## 2023-08-19 RX ORDER — ERGOCALCIFEROL 1.25 MG/1
CAPSULE ORAL
Qty: 5 CAPSULE | Refills: 0 | Status: SHIPPED | OUTPATIENT
Start: 2023-08-19

## 2023-09-15 ENCOUNTER — OFFICE VISIT (OUTPATIENT)
Dept: INTERNAL MEDICINE | Facility: CLINIC | Age: 48
End: 2023-09-15
Payer: COMMERCIAL

## 2023-09-15 VITALS
HEART RATE: 78 BPM | WEIGHT: 205 LBS | OXYGEN SATURATION: 97 % | HEIGHT: 70 IN | BODY MASS INDEX: 29.35 KG/M2 | SYSTOLIC BLOOD PRESSURE: 130 MMHG | TEMPERATURE: 97.5 F | DIASTOLIC BLOOD PRESSURE: 80 MMHG

## 2023-09-15 DIAGNOSIS — I10 ESSENTIAL HYPERTENSION: ICD-10-CM

## 2023-09-15 DIAGNOSIS — E78.2 MIXED HYPERLIPIDEMIA: ICD-10-CM

## 2023-09-15 DIAGNOSIS — J45.40 MODERATE PERSISTENT ASTHMA WITHOUT COMPLICATION: ICD-10-CM

## 2023-09-15 DIAGNOSIS — M11.20 CALCIUM PYROPHOSPHATE DEPOSITION DISEASE (CPPD): Primary | ICD-10-CM

## 2023-09-15 RX ORDER — LORATADINE 10 MG/1
10 TABLET ORAL DAILY
Qty: 90 TABLET | Refills: 1 | Status: SHIPPED | OUTPATIENT
Start: 2023-09-15

## 2023-09-15 NOTE — PROGRESS NOTES
Hypertension and Hyperlipidemia    Subjective   Tc Joshua is a 48 y.o. male is here today for follow-up.    History of Present Illness  Here for a f/u on his asthma, htn and hlp.  Uses the symbicort regularly. Missed his appointment for his hand, but has it rescheduled.  Hand ain is bad, voltaren gel is a little helpful.      Current Outpatient Medications:     albuterol sulfate  (90 Base) MCG/ACT inhaler, Inhale 2 puffs Every 4 (Four) Hours As Needed for Wheezing., Disp: 18 g, Rfl: 3    atorvastatin (LIPITOR) 10 MG tablet, Take 1 tablet by mouth Daily., Disp: 90 tablet, Rfl: 3    budesonide-formoterol (SYMBICORT) 160-4.5 MCG/ACT inhaler, Inhale 2 puffs 2 (Two) Times a Day., Disp: 10.2 g, Rfl: 5    chlorthalidone (HYGROTON) 25 MG tablet, Take 1 tablet by mouth Daily., Disp: 90 tablet, Rfl: 3    colchicine 0.6 MG tablet, 1 PO BID prn joint pain. Hold for diarrhea, Disp: 30 tablet, Rfl: 3    Diclofenac Sodium (VOLTAREN) 1 % gel gel, Apply  topically to the appropriate area as directed 3 (Three) Times a Day., Disp: 100 g, Rfl: 5    lisinopril (PRINIVIL,ZESTRIL) 30 MG tablet, Take 1 tablet by mouth Daily., Disp: 90 tablet, Rfl: 3    loratadine (CLARITIN) 10 MG tablet, Take 1 tablet by mouth Daily., Disp: 90 tablet, Rfl: 1    montelukast (SINGULAIR) 10 MG tablet, Take 1 tablet by mouth Every Evening., Disp: 90 tablet, Rfl: 3    potassium chloride 10 MEQ CR tablet, Take 1 tablet by mouth 2 (Two) Times a Day., Disp: 180 tablet, Rfl: 3    vitamin D (ERGOCALCIFEROL) 1.25 MG (76984 UT) capsule capsule, TAKE 1 CAPSULE BY MOUTH 1 TIME EVERY WEEK, Disp: 5 capsule, Rfl: 0      The following portions of the patient's history were reviewed and updated as appropriate: allergies, current medications, past family history, past medical history, past social history, past surgical history and problem list.    Review of Systems   Constitutional: Negative.  Negative for chills and fever.   HENT:  Positive for congestion and  "postnasal drip. Negative for ear discharge, ear pain, sinus pressure and sore throat.    Respiratory:  Positive for shortness of breath. Negative for cough and chest tightness.    Cardiovascular:  Negative for chest pain, palpitations and leg swelling.   Gastrointestinal:  Negative for diarrhea, nausea and vomiting.   Musculoskeletal:  Negative for arthralgias, back pain and myalgias.   Neurological:  Negative for dizziness, syncope and headaches.   Psychiatric/Behavioral:  Negative for confusion and sleep disturbance.      Objective   /80   Pulse 78   Temp 97.5 °F (36.4 °C)   Ht 177.8 cm (70\")   Wt 93 kg (205 lb)   SpO2 97% Comment: ra  BMI 29.41 kg/m²   Physical Exam  Vitals and nursing note reviewed.   Constitutional:       Appearance: He is well-developed.   HENT:      Head: Normocephalic and atraumatic.      Right Ear: External ear normal. Tympanic membrane is bulging.      Left Ear: External ear normal. Tympanic membrane is bulging.      Mouth/Throat:      Pharynx: Posterior oropharyngeal erythema present. No oropharyngeal exudate.      Tonsils: No tonsillar abscesses.   Eyes:      Conjunctiva/sclera: Conjunctivae normal.      Pupils: Pupils are equal, round, and reactive to light.   Neck:      Thyroid: No thyromegaly.   Cardiovascular:      Rate and Rhythm: Normal rate and regular rhythm.      Pulses: Normal pulses.      Heart sounds: Normal heart sounds. No murmur heard.    No friction rub. No gallop.   Pulmonary:      Effort: Pulmonary effort is normal.      Breath sounds: Normal breath sounds. No stridor.   Abdominal:      General: Bowel sounds are normal. There is no distension.      Palpations: Abdomen is soft.      Tenderness: There is no abdominal tenderness.   Musculoskeletal:      Cervical back: Normal range of motion and neck supple.   Lymphadenopathy:      Cervical: No cervical adenopathy.   Skin:     General: Skin is warm and dry.   Neurological:      Mental Status: He is alert and " oriented to person, place, and time.      Cranial Nerves: No cranial nerve deficit.   Psychiatric:         Judgment: Judgment normal.         Results for orders placed or performed in visit on 03/14/23   Uric Acid    Specimen: Blood   Result Value Ref Range    Uric Acid 5.9 3.4 - 7.0 mg/dL   Vitamin D,25-Hydroxy    Specimen: Blood   Result Value Ref Range    25 Hydroxy, Vitamin D 54.7 30.0 - 100.0 ng/ml   CBC (No Diff)    Specimen: Blood   Result Value Ref Range    WBC 4.87 3.40 - 10.80 10*3/mm3    RBC 5.20 4.14 - 5.80 10*6/mm3    Hemoglobin 15.1 13.0 - 17.7 g/dL    Hematocrit 43.6 37.5 - 51.0 %    MCV 83.8 79.0 - 97.0 fL    MCH 29.0 26.6 - 33.0 pg    MCHC 34.6 31.5 - 35.7 g/dL    RDW 13.6 12.3 - 15.4 %    RDW-SD 40.7 37.0 - 54.0 fl    MPV 10.5 6.0 - 12.0 fL    Platelets 236 140 - 450 10*3/mm3   Comprehensive Metabolic Panel    Specimen: Blood   Result Value Ref Range    Glucose 94 65 - 99 mg/dL    BUN 17 6 - 20 mg/dL    Creatinine 1.18 0.76 - 1.27 mg/dL    Sodium 137 136 - 145 mmol/L    Potassium 4.0 3.5 - 5.2 mmol/L    Chloride 98 98 - 107 mmol/L    CO2 29.7 (H) 22.0 - 29.0 mmol/L    Calcium 10.4 8.6 - 10.5 mg/dL    Total Protein 8.1 6.0 - 8.5 g/dL    Albumin 4.9 3.5 - 5.2 g/dL    ALT (SGPT) 44 (H) 1 - 41 U/L    AST (SGOT) 37 1 - 40 U/L    Alkaline Phosphatase 72 39 - 117 U/L    Total Bilirubin 0.3 0.0 - 1.2 mg/dL    Globulin 3.2 gm/dL    A/G Ratio 1.5 g/dL    BUN/Creatinine Ratio 14.4 7.0 - 25.0    Anion Gap 9.3 5.0 - 15.0 mmol/L    eGFR 76.6 >60.0 mL/min/1.73   Lipid Panel    Specimen: Blood   Result Value Ref Range    Total Cholesterol 200 0 - 200 mg/dL    Triglycerides 104 0 - 150 mg/dL    HDL Cholesterol 52 40 - 60 mg/dL    LDL Cholesterol  129 (H) 0 - 100 mg/dL    VLDL Cholesterol 19 5 - 40 mg/dL    LDL/HDL Ratio 2.45    Hemoglobin A1c    Specimen: Blood   Result Value Ref Range    Hemoglobin A1C 5.70 (H) 4.80 - 5.60 %   TSH    Specimen: Blood   Result Value Ref Range    TSH 0.561 0.270 - 4.200 uIU/mL   PSA  Screen    Specimen: Blood   Result Value Ref Range    PSA 0.767 0.000 - 4.000 ng/mL             Assessment & Plan   Diagnoses and all orders for this visit:    Calcium pyrophosphate deposition disease (CPPD)  Comments:  cont voltaren gel. cochicine prn.    Moderate persistent asthma without complication  -     loratadine (CLARITIN) 10 MG tablet; Take 1 tablet by mouth Daily.    Mixed hyperlipidemia  -     Comprehensive Metabolic Panel; Future  -     Lipid Panel; Future  -     TSH Rfx On Abnormal To Free T4; Future    Essential hypertension  -     Comprehensive Metabolic Panel; Future      Will keep hand surgery appointment next week. Counseled on diet, can add tart cherry juice etc.       Return in about 6 months (around 3/15/2024) for Annual.    Electronically signed by:    Alexandrea Hanna MD

## 2023-10-03 RX ORDER — ERGOCALCIFEROL 1.25 MG/1
CAPSULE ORAL
Qty: 5 CAPSULE | Refills: 0 | OUTPATIENT
Start: 2023-10-03

## 2023-11-02 DIAGNOSIS — J45.40 MODERATE PERSISTENT ASTHMA WITHOUT COMPLICATION: ICD-10-CM

## 2023-11-03 RX ORDER — BUDESONIDE AND FORMOTEROL FUMARATE DIHYDRATE 160; 4.5 UG/1; UG/1
2 AEROSOL RESPIRATORY (INHALATION) 2 TIMES DAILY
Qty: 10.2 G | Refills: 5 | Status: SHIPPED | OUTPATIENT
Start: 2023-11-03

## 2024-01-08 DIAGNOSIS — J45.40 MODERATE PERSISTENT ASTHMA WITHOUT COMPLICATION: ICD-10-CM

## 2024-01-08 RX ORDER — ALBUTEROL SULFATE 90 UG/1
AEROSOL, METERED RESPIRATORY (INHALATION)
Qty: 18 G | Refills: 5 | Status: SHIPPED | OUTPATIENT
Start: 2024-01-08

## 2024-03-18 DIAGNOSIS — I10 UNCONTROLLED HYPERTENSION: ICD-10-CM

## 2024-03-18 RX ORDER — CHLORTHALIDONE 25 MG/1
25 TABLET ORAL DAILY
Qty: 90 TABLET | Refills: 0 | Status: SHIPPED | OUTPATIENT
Start: 2024-03-18

## 2024-04-17 DIAGNOSIS — J45.40 MODERATE PERSISTENT ASTHMA WITHOUT COMPLICATION: ICD-10-CM

## 2024-04-17 RX ORDER — LORATADINE 10 MG/1
10 TABLET ORAL DAILY
Qty: 90 TABLET | Refills: 1 | Status: SHIPPED | OUTPATIENT
Start: 2024-04-17

## 2024-05-09 ENCOUNTER — LAB (OUTPATIENT)
Dept: LAB | Facility: HOSPITAL | Age: 49
End: 2024-05-09
Payer: COMMERCIAL

## 2024-05-09 ENCOUNTER — OFFICE VISIT (OUTPATIENT)
Dept: INTERNAL MEDICINE | Facility: CLINIC | Age: 49
End: 2024-05-09
Payer: COMMERCIAL

## 2024-05-09 VITALS
HEIGHT: 70 IN | SYSTOLIC BLOOD PRESSURE: 122 MMHG | WEIGHT: 194.6 LBS | BODY MASS INDEX: 27.86 KG/M2 | OXYGEN SATURATION: 97 % | TEMPERATURE: 97.5 F | HEART RATE: 67 BPM | DIASTOLIC BLOOD PRESSURE: 84 MMHG

## 2024-05-09 DIAGNOSIS — E55.9 VITAMIN D DEFICIENCY: ICD-10-CM

## 2024-05-09 DIAGNOSIS — E87.6 HYPOKALEMIA: ICD-10-CM

## 2024-05-09 DIAGNOSIS — E78.2 MIXED HYPERLIPIDEMIA: ICD-10-CM

## 2024-05-09 DIAGNOSIS — J45.40 MODERATE PERSISTENT ASTHMA WITHOUT COMPLICATION: ICD-10-CM

## 2024-05-09 DIAGNOSIS — I10 UNCONTROLLED HYPERTENSION: ICD-10-CM

## 2024-05-09 DIAGNOSIS — Z12.5 PROSTATE CANCER SCREENING: ICD-10-CM

## 2024-05-09 DIAGNOSIS — Z12.11 SCREENING FOR COLON CANCER: ICD-10-CM

## 2024-05-09 DIAGNOSIS — Z00.00 ROUTINE GENERAL MEDICAL EXAMINATION AT A HEALTH CARE FACILITY: ICD-10-CM

## 2024-05-09 DIAGNOSIS — M06.00 SERONEGATIVE RHEUMATOID ARTHRITIS: ICD-10-CM

## 2024-05-09 DIAGNOSIS — J45.20 MILD INTERMITTENT ASTHMA WITHOUT COMPLICATION: ICD-10-CM

## 2024-05-09 DIAGNOSIS — Z00.00 ROUTINE GENERAL MEDICAL EXAMINATION AT A HEALTH CARE FACILITY: Primary | ICD-10-CM

## 2024-05-09 LAB
25(OH)D3 SERPL-MCNC: 34.8 NG/ML (ref 30–100)
ALBUMIN SERPL-MCNC: 4.5 G/DL (ref 3.5–5.2)
ALBUMIN/GLOB SERPL: 1.5 G/DL
ALP SERPL-CCNC: 67 U/L (ref 39–117)
ALT SERPL W P-5'-P-CCNC: 34 U/L (ref 1–41)
ANION GAP SERPL CALCULATED.3IONS-SCNC: 10.3 MMOL/L (ref 5–15)
AST SERPL-CCNC: 30 U/L (ref 1–40)
BASOPHILS # BLD AUTO: 0.04 10*3/MM3 (ref 0–0.2)
BASOPHILS NFR BLD AUTO: 1 % (ref 0–1.5)
BILIRUB BLD-MCNC: NEGATIVE MG/DL
BILIRUB SERPL-MCNC: 0.4 MG/DL (ref 0–1.2)
BUN SERPL-MCNC: 18 MG/DL (ref 6–20)
BUN/CREAT SERPL: 16.7 (ref 7–25)
CALCIUM SPEC-SCNC: 9.6 MG/DL (ref 8.6–10.5)
CHLORIDE SERPL-SCNC: 99 MMOL/L (ref 98–107)
CHOLEST SERPL-MCNC: 172 MG/DL (ref 0–200)
CLARITY, POC: CLEAR
CO2 SERPL-SCNC: 27.7 MMOL/L (ref 22–29)
COLOR UR: YELLOW
CREAT SERPL-MCNC: 1.08 MG/DL (ref 0.76–1.27)
DEPRECATED RDW RBC AUTO: 45.7 FL (ref 37–54)
EGFRCR SERPLBLD CKD-EPI 2021: 84.1 ML/MIN/1.73
EOSINOPHIL # BLD AUTO: 0.23 10*3/MM3 (ref 0–0.4)
EOSINOPHIL NFR BLD AUTO: 5.7 % (ref 0.3–6.2)
ERYTHROCYTE [DISTWIDTH] IN BLOOD BY AUTOMATED COUNT: 14.6 % (ref 12.3–15.4)
EXPIRATION DATE: NORMAL
GLOBULIN UR ELPH-MCNC: 3 GM/DL
GLUCOSE SERPL-MCNC: 83 MG/DL (ref 65–99)
GLUCOSE UR STRIP-MCNC: NEGATIVE MG/DL
HCT VFR BLD AUTO: 40.8 % (ref 37.5–51)
HDLC SERPL-MCNC: 62 MG/DL (ref 40–60)
HGB BLD-MCNC: 14.2 G/DL (ref 13–17.7)
IMM GRANULOCYTES # BLD AUTO: 0.01 10*3/MM3 (ref 0–0.05)
IMM GRANULOCYTES NFR BLD AUTO: 0.2 % (ref 0–0.5)
KETONES UR QL: NEGATIVE
LDLC SERPL CALC-MCNC: 94 MG/DL (ref 0–100)
LDLC/HDLC SERPL: 1.5 {RATIO}
LEUKOCYTE EST, POC: NEGATIVE
LYMPHOCYTES # BLD AUTO: 1.21 10*3/MM3 (ref 0.7–3.1)
LYMPHOCYTES NFR BLD AUTO: 30 % (ref 19.6–45.3)
Lab: NORMAL
MCH RBC QN AUTO: 30.2 PG (ref 26.6–33)
MCHC RBC AUTO-ENTMCNC: 34.8 G/DL (ref 31.5–35.7)
MCV RBC AUTO: 86.8 FL (ref 79–97)
MONOCYTES # BLD AUTO: 0.63 10*3/MM3 (ref 0.1–0.9)
MONOCYTES NFR BLD AUTO: 15.6 % (ref 5–12)
NEUTROPHILS NFR BLD AUTO: 1.92 10*3/MM3 (ref 1.7–7)
NEUTROPHILS NFR BLD AUTO: 47.5 % (ref 42.7–76)
NITRITE UR-MCNC: NEGATIVE MG/ML
NRBC BLD AUTO-RTO: 0 /100 WBC (ref 0–0.2)
PH UR: 6 [PH] (ref 5–8)
PLATELET # BLD AUTO: 232 10*3/MM3 (ref 140–450)
PMV BLD AUTO: 10.2 FL (ref 6–12)
POTASSIUM SERPL-SCNC: 3.3 MMOL/L (ref 3.5–5.2)
PROT SERPL-MCNC: 7.5 G/DL (ref 6–8.5)
PROT UR STRIP-MCNC: NEGATIVE MG/DL
PSA SERPL-MCNC: 0.97 NG/ML (ref 0–4)
RBC # BLD AUTO: 4.7 10*6/MM3 (ref 4.14–5.8)
RBC # UR STRIP: NEGATIVE /UL
SODIUM SERPL-SCNC: 137 MMOL/L (ref 136–145)
SP GR UR: 1.02 (ref 1–1.03)
TRIGL SERPL-MCNC: 86 MG/DL (ref 0–150)
TSH SERPL DL<=0.05 MIU/L-ACNC: 0.49 UIU/ML (ref 0.27–4.2)
UROBILINOGEN UR QL: NORMAL
VIT B12 BLD-MCNC: 483 PG/ML (ref 211–946)
VLDLC SERPL-MCNC: 16 MG/DL (ref 5–40)
WBC NRBC COR # BLD AUTO: 4.04 10*3/MM3 (ref 3.4–10.8)

## 2024-05-09 PROCEDURE — 80061 LIPID PANEL: CPT

## 2024-05-09 PROCEDURE — 82306 VITAMIN D 25 HYDROXY: CPT

## 2024-05-09 PROCEDURE — 80050 GENERAL HEALTH PANEL: CPT

## 2024-05-09 PROCEDURE — 82607 VITAMIN B-12: CPT

## 2024-05-09 PROCEDURE — G0103 PSA SCREENING: HCPCS

## 2024-05-09 RX ORDER — FOLIC ACID 1 MG/1
1000 TABLET ORAL DAILY
COMMUNITY

## 2024-05-09 RX ORDER — METHOTREXATE 2.5 MG/1
10 TABLET ORAL
COMMUNITY
Start: 2024-02-12

## 2024-05-09 RX ORDER — LISINOPRIL 30 MG/1
30 TABLET ORAL DAILY
Qty: 90 TABLET | Refills: 3 | Status: SHIPPED | OUTPATIENT
Start: 2024-05-09

## 2024-05-09 RX ORDER — MONTELUKAST SODIUM 10 MG/1
10 TABLET ORAL EVERY EVENING
Qty: 90 TABLET | Refills: 3 | Status: SHIPPED | OUTPATIENT
Start: 2024-05-09

## 2024-05-09 RX ORDER — ATORVASTATIN CALCIUM 10 MG/1
10 TABLET, FILM COATED ORAL DAILY
Qty: 90 TABLET | Refills: 3 | Status: SHIPPED | OUTPATIENT
Start: 2024-05-09

## 2024-05-09 RX ORDER — POTASSIUM CHLORIDE 750 MG/1
10 TABLET, FILM COATED, EXTENDED RELEASE ORAL DAILY
Qty: 90 TABLET | Refills: 3 | Status: SHIPPED | OUTPATIENT
Start: 2024-05-09

## 2024-05-09 RX ORDER — CHLORTHALIDONE 25 MG/1
25 TABLET ORAL DAILY
Qty: 90 TABLET | Refills: 3 | Status: SHIPPED | OUTPATIENT
Start: 2024-05-09

## 2024-05-09 RX ORDER — BUDESONIDE AND FORMOTEROL FUMARATE DIHYDRATE 160; 4.5 UG/1; UG/1
2 AEROSOL RESPIRATORY (INHALATION) 2 TIMES DAILY
Qty: 10.2 G | Refills: 5 | Status: SHIPPED | OUTPATIENT
Start: 2024-05-09

## 2024-05-21 DIAGNOSIS — E87.6 HYPOKALEMIA: ICD-10-CM

## 2024-05-21 RX ORDER — POTASSIUM CHLORIDE 750 MG/1
20 TABLET, FILM COATED, EXTENDED RELEASE ORAL DAILY
Qty: 180 TABLET | Refills: 3 | Status: SHIPPED | OUTPATIENT
Start: 2024-05-21

## 2024-07-22 ENCOUNTER — TELEPHONE (OUTPATIENT)
Dept: INTERNAL MEDICINE | Facility: CLINIC | Age: 49
End: 2024-07-22
Payer: COMMERCIAL

## 2024-07-22 NOTE — TELEPHONE ENCOUNTER
I can see him tomorrow at 2:30 PM or Wednesday at 9:15 AM or at 3:30 PM.  Please check if either of the times work for him.

## 2024-07-22 NOTE — TELEPHONE ENCOUNTER
Pt called on Friday 7/19/24, was in [facility] ER on 7/17/24 (Wed morning) for sciatica and still unable to walk.  Pt was given prednisone, Hydrocodone and flexeril.  Pt wanting to know what he can do for the weekend as meds are almost gone.    Dr Hanna gave 3 days of tramadol and prednisone on written scripts for pt.  Pt to call on Monday if not better to see about appt here or to return to ER.  Pt states verbal understanding and will have his wife Laverne  scripts.

## 2024-07-22 NOTE — TELEPHONE ENCOUNTER
Pt would like to be seen tomorrow at 2:30 with Dr Hanna for TCM-sciatica.    Please add to schedule.

## 2024-07-22 NOTE — TELEPHONE ENCOUNTER
PT CALLED BACK IN TO DISCUSS BOOKING AN APT FOR AN ER FOLLOW UP. BOOKED FOR 7-22 WITH [facility], DR. DE GUZMAN HAD NO AVAILABLE APTS

## 2024-07-23 ENCOUNTER — OFFICE VISIT (OUTPATIENT)
Dept: INTERNAL MEDICINE | Facility: CLINIC | Age: 49
End: 2024-07-23
Payer: COMMERCIAL

## 2024-07-23 VITALS
WEIGHT: 198.2 LBS | HEART RATE: 89 BPM | HEIGHT: 70 IN | DIASTOLIC BLOOD PRESSURE: 88 MMHG | OXYGEN SATURATION: 98 % | TEMPERATURE: 97.2 F | BODY MASS INDEX: 28.37 KG/M2 | SYSTOLIC BLOOD PRESSURE: 142 MMHG

## 2024-07-23 DIAGNOSIS — M54.31 RIGHT SIDED SCIATICA: Primary | ICD-10-CM

## 2024-07-23 RX ORDER — PREDNISONE 20 MG/1
40 TABLET ORAL DAILY
COMMUNITY
Start: 2024-07-17

## 2024-07-23 RX ORDER — DICLOFENAC SODIUM 75 MG/1
75 TABLET, DELAYED RELEASE ORAL 2 TIMES DAILY PRN
Qty: 60 TABLET | Refills: 0 | Status: SHIPPED | OUTPATIENT
Start: 2024-07-23

## 2024-07-23 RX ORDER — KETOROLAC TROMETHAMINE 30 MG/ML
60 INJECTION, SOLUTION INTRAMUSCULAR; INTRAVENOUS ONCE
Status: COMPLETED | OUTPATIENT
Start: 2024-07-23 | End: 2024-07-23

## 2024-07-23 RX ORDER — TRAMADOL HYDROCHLORIDE 50 MG/1
50 TABLET ORAL 2 TIMES DAILY
COMMUNITY
Start: 2024-07-20 | End: 2024-07-23 | Stop reason: SDUPTHER

## 2024-07-23 RX ORDER — CYCLOBENZAPRINE HCL 10 MG
10 TABLET ORAL 3 TIMES DAILY PRN
Qty: 30 TABLET | Refills: 2 | Status: SHIPPED | OUTPATIENT
Start: 2024-07-23

## 2024-07-23 RX ORDER — TRAMADOL HYDROCHLORIDE 50 MG/1
50-100 TABLET ORAL 2 TIMES DAILY
Qty: 20 TABLET | Refills: 1 | Status: SHIPPED | OUTPATIENT
Start: 2024-07-23

## 2024-07-23 RX ADMIN — KETOROLAC TROMETHAMINE 60 MG: 30 INJECTION, SOLUTION INTRAMUSCULAR; INTRAVENOUS at 15:19

## 2024-07-23 NOTE — PROGRESS NOTES
Transitional Care Management (Sciatica right sided from bottom to knee/)    Subjective   Tc Joshua is a 49 y.o. male is here today for follow-up.    History of Present Illness  Tc presents for an ER follow-up, reports he has been having sxs x 1 month, and then drove back from vacation 16 hrs straight and then went to work, and then next 2 days- worsened until he could not get up .  Went to [facility] ER. And given -  morphine injection 4 mg (4 mg intraMUSCULAR Given 7/17/24 0902)   ketorolac (TORADOL) injection 60 mg (60 mg intraMUSCULAR Given 7/17/24 0901)   cyclobenzaprine (FLEXERIL) tablet 10 mg (10 mg oral Given 7/17/24 0901)   Was 10/10 in the ER and is now at 4/ 10.      Current Outpatient Medications:     albuterol sulfate  (90 Base) MCG/ACT inhaler, INHALE 2 PUFFS EVERY 4(FOUR) HOURS AS NEEDED FRO WHEEZSING, Disp: 18 g, Rfl: 5    atorvastatin (LIPITOR) 10 MG tablet, Take 1 tablet by mouth Daily., Disp: 90 tablet, Rfl: 3    budesonide-formoterol (Symbicort) 160-4.5 MCG/ACT inhaler, Inhale 2 puffs 2 (Two) Times a Day., Disp: 10.2 g, Rfl: 5    chlorthalidone (HYGROTON) 25 MG tablet, Take 1 tablet by mouth Daily., Disp: 90 tablet, Rfl: 3    Diclofenac Sodium (VOLTAREN) 1 % gel gel, Apply  topically to the appropriate area as directed 3 (Three) Times a Day., Disp: 100 g, Rfl: 5    folic acid (FOLVITE) 1 MG tablet, Take 1 tablet by mouth Daily., Disp: , Rfl:     lisinopril (PRINIVIL,ZESTRIL) 30 MG tablet, Take 1 tablet by mouth Daily., Disp: 90 tablet, Rfl: 3    montelukast (SINGULAIR) 10 MG tablet, Take 1 tablet by mouth Every Evening., Disp: 90 tablet, Rfl: 3    potassium chloride 10 MEQ CR tablet, Take 2 tablets by mouth Daily. With chlorthalidone, Disp: 180 tablet, Rfl: 3    predniSONE (DELTASONE) 20 MG tablet, Take 2 tablets by mouth Daily., Disp: , Rfl:     traMADol (ULTRAM) 50 MG tablet, Take 1-2 tablets by mouth 2 (Two) Times a Day., Disp: 20 tablet, Rfl: 1    cyclobenzaprine (FLEXERIL) 10 MG  "tablet, Take 1 tablet by mouth 3 (Three) Times a Day As Needed for Muscle Spasms., Disp: 30 tablet, Rfl: 2    diclofenac (VOLTAREN) 75 MG EC tablet, Take 1 tablet by mouth 2 (Two) Times a Day As Needed (back pain). With food, Disp: 60 tablet, Rfl: 0    loratadine (CLARITIN) 10 MG tablet, TAKE ONE TABLET BY MOUTH DAILY, Disp: 90 tablet, Rfl: 0      The following portions of the patient's history were reviewed and updated as appropriate: allergies, current medications, past family history, past medical history, past social history, past surgical history and problem list.    Review of Systems   Constitutional: Negative.  Negative for chills and fever.   HENT:  Negative for ear discharge, ear pain, sinus pressure and sore throat.    Respiratory:  Negative for cough, chest tightness and shortness of breath.    Cardiovascular:  Negative for chest pain, palpitations and leg swelling.   Gastrointestinal:  Negative for diarrhea, nausea and vomiting.   Musculoskeletal:  Positive for arthralgias, back pain and gait problem. Negative for myalgias.   Neurological:  Negative for dizziness, syncope and headaches.   Psychiatric/Behavioral:  Negative for confusion and sleep disturbance.        Objective   /88   Pulse 89   Temp 97.2 °F (36.2 °C)   Ht 177.8 cm (70\")   Wt 89.9 kg (198 lb 3.2 oz)   SpO2 98% Comment: ra  BMI 28.44 kg/m²   Physical Exam  Vitals and nursing note reviewed.   Constitutional:       Appearance: He is well-developed.   HENT:      Head: Normocephalic and atraumatic.      Right Ear: External ear normal.      Left Ear: External ear normal.      Mouth/Throat:      Pharynx: No oropharyngeal exudate.   Eyes:      Conjunctiva/sclera: Conjunctivae normal.      Pupils: Pupils are equal, round, and reactive to light.   Neck:      Thyroid: No thyromegaly.   Cardiovascular:      Rate and Rhythm: Normal rate and regular rhythm.      Pulses: Normal pulses.      Heart sounds: Normal heart sounds. No murmur " heard.     No friction rub. No gallop.   Pulmonary:      Effort: Pulmonary effort is normal.      Breath sounds: Normal breath sounds.   Abdominal:      General: Bowel sounds are normal. There is no distension.      Palpations: Abdomen is soft.      Tenderness: There is no abdominal tenderness.   Musculoskeletal:         General: Tenderness present.      Cervical back: Neck supple.   Skin:     General: Skin is warm and dry.   Neurological:      Mental Status: He is alert and oriented to person, place, and time.      Cranial Nerves: No cranial nerve deficit.   Psychiatric:         Judgment: Judgment normal.                 Results for orders placed or performed in visit on 05/09/24   Comprehensive Metabolic Panel    Specimen: Blood   Result Value Ref Range    Glucose 83 65 - 99 mg/dL    BUN 18 6 - 20 mg/dL    Creatinine 1.08 0.76 - 1.27 mg/dL    Sodium 137 136 - 145 mmol/L    Potassium 3.3 (L) 3.5 - 5.2 mmol/L    Chloride 99 98 - 107 mmol/L    CO2 27.7 22.0 - 29.0 mmol/L    Calcium 9.6 8.6 - 10.5 mg/dL    Total Protein 7.5 6.0 - 8.5 g/dL    Albumin 4.5 3.5 - 5.2 g/dL    ALT (SGPT) 34 1 - 41 U/L    AST (SGOT) 30 1 - 40 U/L    Alkaline Phosphatase 67 39 - 117 U/L    Total Bilirubin 0.4 0.0 - 1.2 mg/dL    Globulin 3.0 gm/dL    A/G Ratio 1.5 g/dL    BUN/Creatinine Ratio 16.7 7.0 - 25.0    Anion Gap 10.3 5.0 - 15.0 mmol/L    eGFR 84.1 >60.0 mL/min/1.73   Lipid Panel    Specimen: Blood   Result Value Ref Range    Total Cholesterol 172 0 - 200 mg/dL    Triglycerides 86 0 - 150 mg/dL    HDL Cholesterol 62 (H) 40 - 60 mg/dL    LDL Cholesterol  94 0 - 100 mg/dL    VLDL Cholesterol 16 5 - 40 mg/dL    LDL/HDL Ratio 1.50    TSH Rfx On Abnormal To Free T4    Specimen: Blood   Result Value Ref Range    TSH 0.492 0.270 - 4.200 uIU/mL   Vitamin D,25-Hydroxy    Specimen: Blood   Result Value Ref Range    25 Hydroxy, Vitamin D 34.8 30.0 - 100.0 ng/ml   Vitamin B12    Specimen: Blood   Result Value Ref Range    Vitamin B-12 483 211 -  946 pg/mL   PSA Screen    Specimen: Blood   Result Value Ref Range    PSA 0.975 0.000 - 4.000 ng/mL   CBC Auto Differential    Specimen: Blood   Result Value Ref Range    WBC 4.04 3.40 - 10.80 10*3/mm3    RBC 4.70 4.14 - 5.80 10*6/mm3    Hemoglobin 14.2 13.0 - 17.7 g/dL    Hematocrit 40.8 37.5 - 51.0 %    MCV 86.8 79.0 - 97.0 fL    MCH 30.2 26.6 - 33.0 pg    MCHC 34.8 31.5 - 35.7 g/dL    RDW 14.6 12.3 - 15.4 %    RDW-SD 45.7 37.0 - 54.0 fl    MPV 10.2 6.0 - 12.0 fL    Platelets 232 140 - 450 10*3/mm3    Neutrophil % 47.5 42.7 - 76.0 %    Lymphocyte % 30.0 19.6 - 45.3 %    Monocyte % 15.6 (H) 5.0 - 12.0 %    Eosinophil % 5.7 0.3 - 6.2 %    Basophil % 1.0 0.0 - 1.5 %    Immature Grans % 0.2 0.0 - 0.5 %    Neutrophils, Absolute 1.92 1.70 - 7.00 10*3/mm3    Lymphocytes, Absolute 1.21 0.70 - 3.10 10*3/mm3    Monocytes, Absolute 0.63 0.10 - 0.90 10*3/mm3    Eosinophils, Absolute 0.23 0.00 - 0.40 10*3/mm3    Basophils, Absolute 0.04 0.00 - 0.20 10*3/mm3    Immature Grans, Absolute 0.01 0.00 - 0.05 10*3/mm3    nRBC 0.0 0.0 - 0.2 /100 WBC             Assessment & Plan   Diagnoses and all orders for this visit:    Right sided sciatica  -     ketorolac (TORADOL) injection 60 mg  -     diclofenac (VOLTAREN) 75 MG EC tablet; Take 1 tablet by mouth 2 (Two) Times a Day As Needed (back pain). With food  -     cyclobenzaprine (FLEXERIL) 10 MG tablet; Take 1 tablet by mouth 3 (Three) Times a Day As Needed for Muscle Spasms.  -     XR Spine Lumbar 2 or 3 View; Future  -     Ambulatory Referral to Physical Therapy  -     traMADol (ULTRAM) 50 MG tablet; Take 1-2 tablets by mouth 2 (Two) Times a Day.    Other orders  -     predniSONE (DELTASONE) 20 MG tablet; Take 2 tablets by mouth Daily.  -     Discontinue: traMADol (ULTRAM) 50 MG tablet; Take 1 tablet by mouth 2 (Two) Times a Day.    Continue topical heat and ice.  Continue stretches.  Advised if not better next week, proceed with back x-rays and physical therapy.    ER notes  reviewed.             Return if symptoms worsen or fail to improve.    Electronically signed by:    Alexandrea Hanna MD

## 2024-07-25 DIAGNOSIS — J45.40 MODERATE PERSISTENT ASTHMA WITHOUT COMPLICATION: ICD-10-CM

## 2024-07-25 RX ORDER — LORATADINE 10 MG/1
10 TABLET ORAL DAILY
Qty: 90 TABLET | Refills: 0 | Status: SHIPPED | OUTPATIENT
Start: 2024-07-25

## 2024-08-21 DIAGNOSIS — M54.31 RIGHT SIDED SCIATICA: ICD-10-CM

## 2024-08-21 RX ORDER — DICLOFENAC SODIUM 75 MG/1
TABLET, DELAYED RELEASE ORAL
Qty: 60 TABLET | Refills: 0 | OUTPATIENT
Start: 2024-08-21

## 2024-08-21 NOTE — TELEPHONE ENCOUNTER
Failed protocol.    LOV: 7/23/24  NOV: 11/11/24    Refill Date: 7/23/24  Quantify:60  Refills:0    Spoke to pt stated that he would like to have this on hand as he still has some pain on occasion.

## 2024-09-30 RX ORDER — SODIUM PICOSULFATE, MAGNESIUM OXIDE, AND ANHYDROUS CITRIC ACID 12; 3.5; 1 G/175ML; G/175ML; MG/175ML
350 LIQUID ORAL TAKE AS DIRECTED
Qty: 350 ML | Refills: 0 | Status: SHIPPED | OUTPATIENT
Start: 2024-09-30 | End: 2024-10-01

## 2024-10-02 ENCOUNTER — PRIOR AUTHORIZATION (OUTPATIENT)
Dept: GASTROENTEROLOGY | Facility: CLINIC | Age: 49
End: 2024-10-02
Payer: COMMERCIAL

## 2024-10-02 NOTE — TELEPHONE ENCOUNTER
PA Case ID #: [id number]  Rx #: [id number]  Need Help? Call us at [phone]  Status: Sent to Plan today

## 2024-10-10 DIAGNOSIS — J45.40 MODERATE PERSISTENT ASTHMA WITHOUT COMPLICATION: ICD-10-CM

## 2024-10-10 RX ORDER — LORATADINE 10 MG/1
10 TABLET ORAL DAILY
Qty: 30 TABLET | Refills: 0 | Status: SHIPPED | OUTPATIENT
Start: 2024-10-10

## 2024-10-10 NOTE — TELEPHONE ENCOUNTER
Caller: Tc Joshua    Relationship: Self    Best call back number: [phone]     Requested Prescriptions:   Requested Prescriptions     Pending Prescriptions Disp Refills    loratadine (CLARITIN) 10 MG tablet 90 tablet 0     Sig: Take 1 tablet by mouth Daily.        Pharm[facility] where [facility] be [id number] [address]  AT [facility] [place] - [phone] [facility] [phone] FX     Last office visit with prescribing clinician: 7/23/2024   Last telemedicine visit with prescribing clinician: Visit date not found   Next office visit with prescribing clinician: 11/11/2024     Additional details provided by patient: PATIENT HAS CALLED REQUESTING A NEW PRESCRIPTION ON ABOVE MEDICATION.     Does the patient have less than a 3 day supply:  [x] Yes  [] No    Would you like a call back once the refill request has been completed: [] Yes [x] No    If the office needs to give you a call back, can they leave a voicemail: [] Yes [x] No    Gigi Rivas   10/10/24 14:05 EDT

## 2024-11-11 ENCOUNTER — OFFICE VISIT (OUTPATIENT)
Dept: INTERNAL MEDICINE | Facility: CLINIC | Age: 49
End: 2024-11-11
Payer: COMMERCIAL

## 2024-11-11 ENCOUNTER — LAB (OUTPATIENT)
Dept: LAB | Facility: HOSPITAL | Age: 49
End: 2024-11-11
Payer: COMMERCIAL

## 2024-11-11 VITALS
HEART RATE: 65 BPM | WEIGHT: 200 LBS | OXYGEN SATURATION: 96 % | TEMPERATURE: 97.1 F | BODY MASS INDEX: 28.63 KG/M2 | DIASTOLIC BLOOD PRESSURE: 86 MMHG | HEIGHT: 70 IN | SYSTOLIC BLOOD PRESSURE: 132 MMHG

## 2024-11-11 DIAGNOSIS — E78.2 MIXED HYPERLIPIDEMIA: ICD-10-CM

## 2024-11-11 DIAGNOSIS — E87.6 HYPOKALEMIA: ICD-10-CM

## 2024-11-11 DIAGNOSIS — E87.6 HYPOKALEMIA: Primary | ICD-10-CM

## 2024-11-11 DIAGNOSIS — I10 ESSENTIAL HYPERTENSION: ICD-10-CM

## 2024-11-11 LAB
ALBUMIN SERPL-MCNC: 4.5 G/DL (ref 3.5–5.2)
ALBUMIN/GLOB SERPL: 1.5 G/DL
ALP SERPL-CCNC: 75 U/L (ref 39–117)
ALT SERPL W P-5'-P-CCNC: 31 U/L (ref 1–41)
ANION GAP SERPL CALCULATED.3IONS-SCNC: 12 MMOL/L (ref 5–15)
AST SERPL-CCNC: 27 U/L (ref 1–40)
BILIRUB SERPL-MCNC: 0.3 MG/DL (ref 0–1.2)
BUN SERPL-MCNC: 16 MG/DL (ref 6–20)
BUN/CREAT SERPL: 12.3 (ref 7–25)
CALCIUM SPEC-SCNC: 10.1 MG/DL (ref 8.6–10.5)
CHLORIDE SERPL-SCNC: 99 MMOL/L (ref 98–107)
CHOLEST SERPL-MCNC: 209 MG/DL (ref 0–200)
CO2 SERPL-SCNC: 27 MMOL/L (ref 22–29)
CREAT SERPL-MCNC: 1.3 MG/DL (ref 0.76–1.27)
EGFRCR SERPLBLD CKD-EPI 2021: 67.3 ML/MIN/1.73
GLOBULIN UR ELPH-MCNC: 3.1 GM/DL
GLUCOSE SERPL-MCNC: 93 MG/DL (ref 65–99)
HDLC SERPL-MCNC: 60 MG/DL (ref 40–60)
LDLC SERPL CALC-MCNC: 129 MG/DL (ref 0–100)
LDLC/HDLC SERPL: 2.11 {RATIO}
POTASSIUM SERPL-SCNC: 3.6 MMOL/L (ref 3.5–5.2)
PROT SERPL-MCNC: 7.6 G/DL (ref 6–8.5)
SODIUM SERPL-SCNC: 138 MMOL/L (ref 136–145)
TRIGL SERPL-MCNC: 111 MG/DL (ref 0–150)
VLDLC SERPL-MCNC: 20 MG/DL (ref 5–40)

## 2024-11-11 PROCEDURE — 3079F DIAST BP 80-89 MM HG: CPT | Performed by: INTERNAL MEDICINE

## 2024-11-11 PROCEDURE — 80053 COMPREHEN METABOLIC PANEL: CPT

## 2024-11-11 PROCEDURE — 1126F AMNT PAIN NOTED NONE PRSNT: CPT | Performed by: INTERNAL MEDICINE

## 2024-11-11 PROCEDURE — 3075F SYST BP GE 130 - 139MM HG: CPT | Performed by: INTERNAL MEDICINE

## 2024-11-11 PROCEDURE — 80061 LIPID PANEL: CPT

## 2024-11-11 PROCEDURE — 99214 OFFICE O/P EST MOD 30 MIN: CPT | Performed by: INTERNAL MEDICINE

## 2024-11-11 RX ORDER — SODIUM PICOSULFATE, MAGNESIUM OXIDE, AND ANHYDROUS CITRIC ACID 12; 3.5; 1 G/175ML; G/175ML; MG/175ML
LIQUID ORAL
COMMUNITY
Start: 2024-10-21

## 2024-11-11 NOTE — PROGRESS NOTES
Hypertension and Hyperlipidemia    Subjective   Tc Joshua is a 49 y.o. male is here today for follow-up.    Hypertension  Pertinent negatives include no chest pain, headaches, palpitations or shortness of breath.   Hyperlipidemia  Pertinent negatives include no chest pain, myalgias or shortness of breath.     Tc is here for a follow up on his htn, hlp , asthma, reports back is better. Did not have to go to PT.  Does not take the singulair, has not needed the albuterol inhaler. Uses the symbicort regularly.      Current Outpatient Medications:     albuterol sulfate  (90 Base) MCG/ACT inhaler, INHALE 2 PUFFS EVERY 4(FOUR) HOURS AS NEEDED FRO WHEEZSING, Disp: 18 g, Rfl: 5    atorvastatin (LIPITOR) 10 MG tablet, Take 1 tablet by mouth Daily., Disp: 90 tablet, Rfl: 3    budesonide-formoterol (Symbicort) 160-4.5 MCG/ACT inhaler, Inhale 2 puffs 2 (Two) Times a Day., Disp: 10.2 g, Rfl: 5    chlorthalidone (HYGROTON) 25 MG tablet, Take 1 tablet by mouth Daily., Disp: 90 tablet, Rfl: 3    Diclofenac Sodium (VOLTAREN) 1 % gel gel, Apply  topically to the appropriate area as directed 3 (Three) Times a Day., Disp: 100 g, Rfl: 5    folic acid (FOLVITE) 1 MG tablet, Take 1 tablet by mouth Daily., Disp: , Rfl:     lisinopril (PRINIVIL,ZESTRIL) 30 MG tablet, Take 1 tablet by mouth Daily., Disp: 90 tablet, Rfl: 3    loratadine (CLARITIN) 10 MG tablet, Take 1 tablet by mouth Daily., Disp: 30 tablet, Rfl: 0    montelukast (SINGULAIR) 10 MG tablet, Take 1 tablet by mouth Every Evening., Disp: 90 tablet, Rfl: 3    potassium chloride 10 MEQ CR tablet, Take 2 tablets by mouth Daily. With chlorthalidone, Disp: 180 tablet, Rfl: 3    Clenpiq 10-3.5-12 MG-GM -GM/175ML solution, TAKE 350 ML BY MOUTH FOR 1 DAY AS DIRECTED (Patient not taking: Reported on 11/11/2024), Disp: , Rfl:     cyclobenzaprine (FLEXERIL) 10 MG tablet, Take 1 tablet by mouth 3 (Three) Times a Day As Needed for Muscle Spasms., Disp: 30 tablet, Rfl: 2      The  "following portions of the patient's history were reviewed and updated as appropriate: allergies, current medications, past family history, past medical history, past social history, past surgical history and problem list.    Review of Systems   Constitutional: Negative.  Negative for chills and fever.   HENT:  Positive for postnasal drip. Negative for ear discharge, ear pain, sinus pressure and sore throat.    Respiratory:  Negative for cough, chest tightness and shortness of breath.    Cardiovascular:  Negative for chest pain, palpitations and leg swelling.   Gastrointestinal:  Negative for diarrhea, nausea and vomiting.   Musculoskeletal:  Negative for arthralgias, back pain and myalgias.   Neurological:  Negative for dizziness, syncope and headaches.   Psychiatric/Behavioral:  Negative for confusion and sleep disturbance.        Objective   /86   Pulse 65   Temp 97.1 °F (36.2 °C)   Ht 177.8 cm (70\")   Wt 90.7 kg (200 lb)   SpO2 96% Comment: ra  BMI 28.70 kg/m²   Physical Exam  Vitals and nursing note reviewed.   Constitutional:       Appearance: He is well-developed.   HENT:      Head: Normocephalic and atraumatic.      Right Ear: External ear normal. Tympanic membrane is bulging.      Left Ear: External ear normal. Tympanic membrane is bulging.      Mouth/Throat:      Pharynx: Posterior oropharyngeal erythema present. No oropharyngeal exudate.      Tonsils: No tonsillar abscesses.   Eyes:      Conjunctiva/sclera: Conjunctivae normal.      Pupils: Pupils are equal, round, and reactive to light.   Neck:      Thyroid: No thyromegaly.   Cardiovascular:      Rate and Rhythm: Normal rate and regular rhythm.      Pulses: Normal pulses.      Heart sounds: Normal heart sounds. No murmur heard.     No friction rub. No gallop.   Pulmonary:      Effort: Pulmonary effort is normal.      Breath sounds: Normal breath sounds. No stridor.   Abdominal:      General: Bowel sounds are normal. There is no distension.     "  Palpations: Abdomen is soft.      Tenderness: There is no abdominal tenderness.   Musculoskeletal:      Cervical back: Normal range of motion and neck supple.   Lymphadenopathy:      Cervical: No cervical adenopathy.   Skin:     General: Skin is warm and dry.   Neurological:      Mental Status: He is alert and oriented to person, place, and time.      Cranial Nerves: No cranial nerve deficit.   Psychiatric:         Judgment: Judgment normal.                 Results for orders placed or performed in visit on 05/09/24   Comprehensive Metabolic Panel    Collection Time: 05/09/24  9:04 AM    Specimen: Blood   Result Value Ref Range    Glucose 83 65 - 99 mg/dL    BUN 18 6 - 20 mg/dL    Creatinine 1.08 0.76 - 1.27 mg/dL    Sodium 137 136 - 145 mmol/L    Potassium 3.3 (L) 3.5 - 5.2 mmol/L    Chloride 99 98 - 107 mmol/L    CO2 27.7 22.0 - 29.0 mmol/L    Calcium 9.6 8.6 - 10.5 mg/dL    Total Protein 7.5 6.0 - 8.5 g/dL    Albumin 4.5 3.5 - 5.2 g/dL    ALT (SGPT) 34 1 - 41 U/L    AST (SGOT) 30 1 - 40 U/L    Alkaline Phosphatase 67 39 - 117 U/L    Total Bilirubin 0.4 0.0 - 1.2 mg/dL    Globulin 3.0 gm/dL    A/G Ratio 1.5 g/dL    BUN/Creatinine Ratio 16.7 7.0 - 25.0    Anion Gap 10.3 5.0 - 15.0 mmol/L    eGFR 84.1 >60.0 mL/min/1.73   Lipid Panel    Collection Time: 05/09/24  9:04 AM    Specimen: Blood   Result Value Ref Range    Total Cholesterol 172 0 - 200 mg/dL    Triglycerides 86 0 - 150 mg/dL    HDL Cholesterol 62 (H) 40 - 60 mg/dL    LDL Cholesterol  94 0 - 100 mg/dL    VLDL Cholesterol 16 5 - 40 mg/dL    LDL/HDL Ratio 1.50    TSH Rfx On Abnormal To Free T4    Collection Time: 05/09/24  9:04 AM    Specimen: Blood   Result Value Ref Range    TSH 0.492 0.270 - 4.200 uIU/mL   Vitamin D,25-Hydroxy    Collection Time: 05/09/24  9:04 AM    Specimen: Blood   Result Value Ref Range    25 Hydroxy, Vitamin D 34.8 30.0 - 100.0 ng/ml   Vitamin B12    Collection Time: 05/09/24  9:04 AM    Specimen: Blood   Result Value Ref Range     Vitamin B-12 483 211 - 946 pg/mL   PSA Screen    Collection Time: 05/09/24  9:04 AM    Specimen: Blood   Result Value Ref Range    PSA 0.975 0.000 - 4.000 ng/mL   CBC Auto Differential    Collection Time: 05/09/24  9:04 AM    Specimen: Blood   Result Value Ref Range    WBC 4.04 3.40 - 10.80 10*3/mm3    RBC 4.70 4.14 - 5.80 10*6/mm3    Hemoglobin 14.2 13.0 - 17.7 g/dL    Hematocrit 40.8 37.5 - 51.0 %    MCV 86.8 79.0 - 97.0 fL    MCH 30.2 26.6 - 33.0 pg    MCHC 34.8 31.5 - 35.7 g/dL    RDW 14.6 12.3 - 15.4 %    RDW-SD 45.7 37.0 - 54.0 fl    MPV 10.2 6.0 - 12.0 fL    Platelets 232 140 - 450 10*3/mm3    Neutrophil % 47.5 42.7 - 76.0 %    Lymphocyte % 30.0 19.6 - 45.3 %    Monocyte % 15.6 (H) 5.0 - 12.0 %    Eosinophil % 5.7 0.3 - 6.2 %    Basophil % 1.0 0.0 - 1.5 %    Immature Grans % 0.2 0.0 - 0.5 %    Neutrophils, Absolute 1.92 1.70 - 7.00 10*3/mm3    Lymphocytes, Absolute 1.21 0.70 - 3.10 10*3/mm3    Monocytes, Absolute 0.63 0.10 - 0.90 10*3/mm3    Eosinophils, Absolute 0.23 0.00 - 0.40 10*3/mm3    Basophils, Absolute 0.04 0.00 - 0.20 10*3/mm3    Immature Grans, Absolute 0.01 0.00 - 0.05 10*3/mm3    nRBC 0.0 0.0 - 0.2 /100 WBC             Assessment & Plan   Diagnoses and all orders for this visit:    Hypokalemia  -     Comprehensive Metabolic Panel; Future    Mixed hyperlipidemia  -     Comprehensive Metabolic Panel; Future  -     Lipid Panel; Future    Essential hypertension  -     Comprehensive Metabolic Panel; Future    Other orders  -     Clenpiq 10-3.5-12 MG-GM -GM/175ML solution; TAKE 350 ML BY MOUTH FOR 1 DAY AS DIRECTED (Patient not taking: Reported on 11/11/2024)                 Return in about 6 months (around 5/11/2025) for Annual.    Electronically signed by:    Alexandrea Hanna MD

## 2024-12-02 ENCOUNTER — OUTSIDE FACILITY SERVICE (OUTPATIENT)
Dept: GASTROENTEROLOGY | Facility: CLINIC | Age: 49
End: 2024-12-02
Payer: COMMERCIAL

## 2024-12-02 PROCEDURE — 45378 DIAGNOSTIC COLONOSCOPY: CPT | Performed by: INTERNAL MEDICINE

## 2024-12-18 DIAGNOSIS — E78.2 MIXED HYPERLIPIDEMIA: ICD-10-CM

## 2024-12-18 DIAGNOSIS — J45.40 MODERATE PERSISTENT ASTHMA WITHOUT COMPLICATION: ICD-10-CM

## 2024-12-18 RX ORDER — ALBUTEROL SULFATE 90 UG/1
INHALANT RESPIRATORY (INHALATION)
Qty: 8.5 G | Refills: 5 | Status: SHIPPED | OUTPATIENT
Start: 2024-12-18

## 2024-12-18 RX ORDER — ALBUTEROL SULFATE 90 UG/1
INHALANT RESPIRATORY (INHALATION)
Qty: 8.5 G | Refills: 5 | Status: CANCELLED | OUTPATIENT
Start: 2024-12-18

## 2024-12-18 RX ORDER — LORATADINE 10 MG/1
10 TABLET ORAL DAILY
Qty: 90 TABLET | Refills: 0 | Status: SHIPPED | OUTPATIENT
Start: 2024-12-18

## 2024-12-18 RX ORDER — ATORVASTATIN CALCIUM 10 MG/1
10 TABLET, FILM COATED ORAL DAILY
Qty: 90 TABLET | Refills: 3 | Status: SHIPPED | OUTPATIENT
Start: 2024-12-18

## 2024-12-18 RX ORDER — ATORVASTATIN CALCIUM 10 MG/1
10 TABLET, FILM COATED ORAL DAILY
Qty: 90 TABLET | Refills: 3 | Status: CANCELLED | OUTPATIENT
Start: 2024-12-18

## 2024-12-18 NOTE — TELEPHONE ENCOUNTER
Spoke with pharmacy and they stated patient does not have any refills on file. Atorvastatin sent to pharmacy

## 2025-04-07 DIAGNOSIS — J45.40 MODERATE PERSISTENT ASTHMA WITHOUT COMPLICATION: ICD-10-CM

## 2025-04-07 RX ORDER — BUDESONIDE AND FORMOTEROL FUMARATE DIHYDRATE 160; 4.5 UG/1; UG/1
2 AEROSOL RESPIRATORY (INHALATION) 2 TIMES DAILY
Qty: 10.2 G | Refills: 5 | Status: SHIPPED | OUTPATIENT
Start: 2025-04-07

## 2025-05-12 DIAGNOSIS — I10 UNCONTROLLED HYPERTENSION: ICD-10-CM

## 2025-05-12 DIAGNOSIS — J45.40 MODERATE PERSISTENT ASTHMA WITHOUT COMPLICATION: ICD-10-CM

## 2025-05-12 RX ORDER — CHLORTHALIDONE 25 MG/1
25 TABLET ORAL DAILY
Qty: 90 TABLET | Refills: 3 | Status: SHIPPED | OUTPATIENT
Start: 2025-05-12

## 2025-05-12 RX ORDER — MONTELUKAST SODIUM 10 MG/1
10 TABLET ORAL EVERY EVENING
Qty: 90 TABLET | Refills: 3 | Status: SHIPPED | OUTPATIENT
Start: 2025-05-12

## 2025-05-24 DIAGNOSIS — E87.6 HYPOKALEMIA: ICD-10-CM

## 2025-05-24 DIAGNOSIS — I10 UNCONTROLLED HYPERTENSION: ICD-10-CM

## 2025-05-27 RX ORDER — LISINOPRIL 30 MG/1
30 TABLET ORAL DAILY
Qty: 90 TABLET | Refills: 0 | Status: SHIPPED | OUTPATIENT
Start: 2025-05-27

## 2025-05-27 RX ORDER — POTASSIUM CHLORIDE 750 MG/1
TABLET, EXTENDED RELEASE ORAL
Qty: 180 TABLET | Refills: 0 | Status: SHIPPED | OUTPATIENT
Start: 2025-05-27

## 2025-06-27 DIAGNOSIS — J45.40 MODERATE PERSISTENT ASTHMA WITHOUT COMPLICATION: ICD-10-CM

## 2025-06-27 RX ORDER — LORATADINE 10 MG/1
10 TABLET ORAL DAILY
Qty: 90 TABLET | Refills: 0 | Status: SHIPPED | OUTPATIENT
Start: 2025-06-27

## 2025-08-06 ENCOUNTER — OFFICE VISIT (OUTPATIENT)
Dept: INTERNAL MEDICINE | Facility: CLINIC | Age: 50
End: 2025-08-06
Payer: COMMERCIAL

## 2025-08-06 ENCOUNTER — LAB (OUTPATIENT)
Dept: LAB | Facility: HOSPITAL | Age: 50
End: 2025-08-06
Payer: COMMERCIAL

## 2025-08-06 VITALS
SYSTOLIC BLOOD PRESSURE: 126 MMHG | HEIGHT: 70 IN | WEIGHT: 199.6 LBS | DIASTOLIC BLOOD PRESSURE: 86 MMHG | HEART RATE: 75 BPM | TEMPERATURE: 97.2 F | BODY MASS INDEX: 28.58 KG/M2 | OXYGEN SATURATION: 96 %

## 2025-08-06 DIAGNOSIS — J45.40 MODERATE PERSISTENT ASTHMA WITHOUT COMPLICATION: ICD-10-CM

## 2025-08-06 DIAGNOSIS — E78.2 MIXED HYPERLIPIDEMIA: ICD-10-CM

## 2025-08-06 DIAGNOSIS — E55.9 VITAMIN D DEFICIENCY: ICD-10-CM

## 2025-08-06 DIAGNOSIS — Z12.5 PROSTATE CANCER SCREENING: ICD-10-CM

## 2025-08-06 DIAGNOSIS — M54.31 RIGHT SIDED SCIATICA: ICD-10-CM

## 2025-08-06 DIAGNOSIS — Z00.00 ROUTINE GENERAL MEDICAL EXAMINATION AT A HEALTH CARE FACILITY: Primary | ICD-10-CM

## 2025-08-06 DIAGNOSIS — Z00.00 ROUTINE GENERAL MEDICAL EXAMINATION AT A HEALTH CARE FACILITY: ICD-10-CM

## 2025-08-06 DIAGNOSIS — I10 UNCONTROLLED HYPERTENSION: ICD-10-CM

## 2025-08-06 DIAGNOSIS — E87.6 HYPOKALEMIA: ICD-10-CM

## 2025-08-06 LAB
25(OH)D3 SERPL-MCNC: 43.2 NG/ML (ref 30–100)
ALBUMIN SERPL-MCNC: 4.6 G/DL (ref 3.5–5.2)
ALBUMIN/GLOB SERPL: 1.4 G/DL
ALP SERPL-CCNC: 71 U/L (ref 39–117)
ALT SERPL W P-5'-P-CCNC: 39 U/L (ref 1–41)
ANION GAP SERPL CALCULATED.3IONS-SCNC: 13.6 MMOL/L (ref 5–15)
AST SERPL-CCNC: 34 U/L (ref 1–40)
BILIRUB BLD-MCNC: NEGATIVE MG/DL
BILIRUB SERPL-MCNC: 0.3 MG/DL (ref 0–1.2)
BUN SERPL-MCNC: 20 MG/DL (ref 6–20)
BUN/CREAT SERPL: 18.3 (ref 7–25)
CALCIUM SPEC-SCNC: 9.8 MG/DL (ref 8.6–10.5)
CHLORIDE SERPL-SCNC: 95 MMOL/L (ref 98–107)
CHOLEST SERPL-MCNC: 210 MG/DL (ref 0–200)
CLARITY, POC: CLEAR
CO2 SERPL-SCNC: 29.4 MMOL/L (ref 22–29)
COLOR UR: YELLOW
CREAT SERPL-MCNC: 1.09 MG/DL (ref 0.76–1.27)
DEPRECATED RDW RBC AUTO: 44.6 FL (ref 37–54)
EGFRCR SERPLBLD CKD-EPI 2021: 82.7 ML/MIN/1.73
ERYTHROCYTE [DISTWIDTH] IN BLOOD BY AUTOMATED COUNT: 13.9 % (ref 12.3–15.4)
EXPIRATION DATE: ABNORMAL
GLOBULIN UR ELPH-MCNC: 3.4 GM/DL
GLUCOSE SERPL-MCNC: 97 MG/DL (ref 65–99)
GLUCOSE UR STRIP-MCNC: NEGATIVE MG/DL
HBA1C MFR BLD: 5.7 % (ref 4.8–5.6)
HCT VFR BLD AUTO: 44.7 % (ref 37.5–51)
HDLC SERPL-MCNC: 59 MG/DL (ref 40–60)
HGB BLD-MCNC: 14.7 G/DL (ref 13–17.7)
KETONES UR QL: NEGATIVE
LDLC SERPL CALC-MCNC: 141 MG/DL (ref 0–100)
LDLC/HDLC SERPL: 2.37 {RATIO}
LEUKOCYTE EST, POC: NEGATIVE
Lab: ABNORMAL
MCH RBC QN AUTO: 28.9 PG (ref 26.6–33)
MCHC RBC AUTO-ENTMCNC: 32.9 G/DL (ref 31.5–35.7)
MCV RBC AUTO: 87.8 FL (ref 79–97)
NITRITE UR-MCNC: NEGATIVE MG/ML
PH UR: 6 [PH] (ref 5–8)
PLATELET # BLD AUTO: 213 10*3/MM3 (ref 140–450)
PMV BLD AUTO: 10.2 FL (ref 6–12)
POTASSIUM SERPL-SCNC: 3.6 MMOL/L (ref 3.5–5.2)
PROT SERPL-MCNC: 8 G/DL (ref 6–8.5)
PROT UR STRIP-MCNC: ABNORMAL MG/DL
PSA SERPL-MCNC: 0.69 NG/ML (ref 0–4)
RBC # BLD AUTO: 5.09 10*6/MM3 (ref 4.14–5.8)
RBC # UR STRIP: NEGATIVE /UL
SODIUM SERPL-SCNC: 138 MMOL/L (ref 136–145)
SP GR UR: 1.01 (ref 1–1.03)
TRIGL SERPL-MCNC: 56 MG/DL (ref 0–150)
TSH SERPL DL<=0.05 MIU/L-ACNC: 0.63 UIU/ML (ref 0.27–4.2)
UROBILINOGEN UR QL: NORMAL
VIT B12 BLD-MCNC: 643 PG/ML (ref 211–946)
VLDLC SERPL-MCNC: 10 MG/DL (ref 5–40)
WBC NRBC COR # BLD AUTO: 4.85 10*3/MM3 (ref 3.4–10.8)

## 2025-08-06 PROCEDURE — 1126F AMNT PAIN NOTED NONE PRSNT: CPT | Performed by: INTERNAL MEDICINE

## 2025-08-06 PROCEDURE — 82607 VITAMIN B-12: CPT

## 2025-08-06 PROCEDURE — 83036 HEMOGLOBIN GLYCOSYLATED A1C: CPT

## 2025-08-06 PROCEDURE — 1160F RVW MEDS BY RX/DR IN RCRD: CPT | Performed by: INTERNAL MEDICINE

## 2025-08-06 PROCEDURE — 99396 PREV VISIT EST AGE 40-64: CPT | Performed by: INTERNAL MEDICINE

## 2025-08-06 PROCEDURE — 83695 ASSAY OF LIPOPROTEIN(A): CPT

## 2025-08-06 PROCEDURE — 1159F MED LIST DOCD IN RCRD: CPT | Performed by: INTERNAL MEDICINE

## 2025-08-06 PROCEDURE — 80061 LIPID PANEL: CPT

## 2025-08-06 PROCEDURE — 3079F DIAST BP 80-89 MM HG: CPT | Performed by: INTERNAL MEDICINE

## 2025-08-06 PROCEDURE — 82306 VITAMIN D 25 HYDROXY: CPT

## 2025-08-06 PROCEDURE — 3074F SYST BP LT 130 MM HG: CPT | Performed by: INTERNAL MEDICINE

## 2025-08-06 PROCEDURE — 80050 GENERAL HEALTH PANEL: CPT

## 2025-08-06 PROCEDURE — G0103 PSA SCREENING: HCPCS

## 2025-08-06 PROCEDURE — 81003 URINALYSIS AUTO W/O SCOPE: CPT | Performed by: INTERNAL MEDICINE

## 2025-08-06 RX ORDER — ATORVASTATIN CALCIUM 10 MG/1
10 TABLET, FILM COATED ORAL DAILY
Qty: 90 TABLET | Refills: 3 | Status: SHIPPED | OUTPATIENT
Start: 2025-08-06

## 2025-08-06 RX ORDER — BUDESONIDE AND FORMOTEROL FUMARATE DIHYDRATE 160; 4.5 UG/1; UG/1
2 AEROSOL RESPIRATORY (INHALATION) 2 TIMES DAILY
Qty: 10.2 G | Refills: 5 | Status: SHIPPED | OUTPATIENT
Start: 2025-08-06

## 2025-08-06 RX ORDER — LORATADINE 10 MG/1
10 TABLET ORAL DAILY
Qty: 90 TABLET | Refills: 3 | Status: SHIPPED | OUTPATIENT
Start: 2025-08-06

## 2025-08-06 RX ORDER — CHLORTHALIDONE 25 MG/1
25 TABLET ORAL DAILY
Qty: 90 TABLET | Refills: 3 | Status: SHIPPED | OUTPATIENT
Start: 2025-08-06

## 2025-08-06 RX ORDER — MONTELUKAST SODIUM 10 MG/1
10 TABLET ORAL EVERY EVENING
Qty: 90 TABLET | Refills: 3 | Status: SHIPPED | OUTPATIENT
Start: 2025-08-06

## 2025-08-06 RX ORDER — ALBUTEROL SULFATE 90 UG/1
2 INHALANT RESPIRATORY (INHALATION) EVERY 4 HOURS PRN
Qty: 8.5 G | Refills: 5 | Status: SHIPPED | OUTPATIENT
Start: 2025-08-06

## 2025-08-06 RX ORDER — CYCLOBENZAPRINE HCL 10 MG
10 TABLET ORAL 3 TIMES DAILY PRN
Qty: 30 TABLET | Refills: 2 | Status: SHIPPED | OUTPATIENT
Start: 2025-08-06

## 2025-08-06 RX ORDER — LISINOPRIL 30 MG/1
30 TABLET ORAL DAILY
Qty: 90 TABLET | Refills: 3 | Status: SHIPPED | OUTPATIENT
Start: 2025-08-06

## 2025-08-06 RX ORDER — POTASSIUM CHLORIDE 750 MG/1
20 TABLET, EXTENDED RELEASE ORAL DAILY
Qty: 180 TABLET | Refills: 1 | Status: SHIPPED | OUTPATIENT
Start: 2025-08-06

## 2025-08-07 LAB — LPA SERPL-SCNC: 128.1 NMOL/L

## 2025-08-28 DIAGNOSIS — I10 UNCONTROLLED HYPERTENSION: ICD-10-CM

## 2025-08-28 DIAGNOSIS — E87.6 HYPOKALEMIA: ICD-10-CM

## 2025-08-28 RX ORDER — LISINOPRIL 30 MG/1
30 TABLET ORAL DAILY
Qty: 90 TABLET | Refills: 3 | OUTPATIENT
Start: 2025-08-28

## 2025-08-28 RX ORDER — POTASSIUM CHLORIDE 750 MG/1
TABLET, EXTENDED RELEASE ORAL
Qty: 180 TABLET | Refills: 1 | OUTPATIENT
Start: 2025-08-28